# Patient Record
Sex: MALE | Race: WHITE | NOT HISPANIC OR LATINO | Employment: OTHER | URBAN - METROPOLITAN AREA
[De-identification: names, ages, dates, MRNs, and addresses within clinical notes are randomized per-mention and may not be internally consistent; named-entity substitution may affect disease eponyms.]

---

## 2022-12-20 ENCOUNTER — OFFICE VISIT (OUTPATIENT)
Dept: FAMILY MEDICINE CLINIC | Facility: CLINIC | Age: 52
End: 2022-12-20

## 2022-12-20 VITALS
WEIGHT: 187 LBS | TEMPERATURE: 97.8 F | DIASTOLIC BLOOD PRESSURE: 70 MMHG | SYSTOLIC BLOOD PRESSURE: 110 MMHG | BODY MASS INDEX: 26.77 KG/M2 | HEART RATE: 78 BPM | HEIGHT: 70 IN | OXYGEN SATURATION: 98 %

## 2022-12-20 DIAGNOSIS — R05.3 PERSISTENT COUGH: ICD-10-CM

## 2022-12-20 DIAGNOSIS — R06.02 SHORTNESS OF BREATH: ICD-10-CM

## 2022-12-20 RX ORDER — METHYLPREDNISOLONE 4 MG/1
TABLET ORAL
Qty: 21 EACH | Refills: 0 | Status: SHIPPED | OUTPATIENT
Start: 2022-12-20

## 2022-12-20 RX ORDER — LEVOCETIRIZINE DIHYDROCHLORIDE 5 MG/1
5 TABLET, FILM COATED ORAL EVERY EVENING
Qty: 90 TABLET | Refills: 1 | Status: SHIPPED | OUTPATIENT
Start: 2022-12-20

## 2022-12-20 RX ORDER — DOXYCYCLINE HYCLATE 100 MG/1
100 CAPSULE ORAL EVERY 12 HOURS SCHEDULED
Qty: 20 CAPSULE | Refills: 0 | Status: SHIPPED | OUTPATIENT
Start: 2022-12-20 | End: 2022-12-30

## 2022-12-20 NOTE — PROGRESS NOTES
Assessment/Plan:         Problem List Items Addressed This Visit        Other    Persistent cough    Relevant Medications    levocetirizine (XYZAL) 5 MG tablet    doxycycline hyclate (VIBRAMYCIN) 100 mg capsule    methylPREDNISolone 4 MG tablet therapy pack    Shortness of breath    Relevant Medications    levocetirizine (XYZAL) 5 MG tablet    doxycycline hyclate (VIBRAMYCIN) 100 mg capsule    methylPREDNISolone 4 MG tablet therapy pack         Subjective:      Patient ID: Charlott Opitz is a 47 y o  male  Tara Caldwell has been sick for about 11 days, he has been taking robutussin for 10 days  The following portions of the patient's history were reviewed and updated as appropriate:   Past Medical History:  He has a past medical history of Anxiety, Depression, HILLARY (obstructive sleep apnea), and Snoring  ,  _______________________________________________________________________  Medical Problems:  does not have any pertinent problems on file ,  _______________________________________________________________________  Past Surgical History:   has a past surgical history that includes Back surgery; Lumbar discectomy; Hand nerve repair (Left); pr esophagogastroduodenoscopy transoral diagnostic (N/A, 6/26/2017); and Colonoscopy  ,  _______________________________________________________________________  Family History:  family history includes ADD / ADHD in his mother; Bipolar disorder in his brother, mother, and son; Substance Abuse in his brother and mother ,  _______________________________________________________________________  Social History:   reports that he has quit smoking  His smokeless tobacco use includes chew  He reports current alcohol use of about 4 0 standard drinks per week  He reports that he does not use drugs  ,  _______________________________________________________________________  Allergies:  is allergic to diclofenac sodium     _______________________________________________________________________  Current Outpatient Medications   Medication Sig Dispense Refill   • doxycycline hyclate (VIBRAMYCIN) 100 mg capsule Take 1 capsule (100 mg total) by mouth every 12 (twelve) hours for 10 days 20 capsule 0   • levocetirizine (XYZAL) 5 MG tablet Take 1 tablet (5 mg total) by mouth every evening 90 tablet 1   • methylPREDNISolone 4 MG tablet therapy pack Use as directed on package 21 each 0   • levothyroxine 50 mcg tablet TAKE 1 TABLET BY MOUTH EVERY DAY 30 tablet 5   • montelukast (SINGULAIR) 10 mg tablet Take 1 tablet (10 mg total) by mouth daily at bedtime 30 tablet 2   • pantoprazole (PROTONIX) 40 mg tablet TAKE 1 TABLET BY MOUTH EVERY DAY 30 tablet 11     No current facility-administered medications for this visit      _______________________________________________________________________  Review of Systems   Constitutional: Positive for fatigue  Negative for chills and fever  HENT: Positive for congestion, rhinorrhea, sinus pressure, sinus pain and sore throat  Negative for ear pain  Eyes: Negative for pain and visual disturbance  Respiratory: Positive for cough, chest tightness, shortness of breath and wheezing  Cardiovascular: Negative for chest pain and palpitations  Gastrointestinal: Negative for abdominal pain, diarrhea, nausea and vomiting  Genitourinary: Negative for dysuria, frequency, hematuria and urgency  Musculoskeletal: Negative for arthralgias, back pain and myalgias  Skin: Negative for color change and rash  Neurological: Negative for dizziness, seizures, syncope and light-headedness  Psychiatric/Behavioral: Positive for sleep disturbance  All other systems reviewed and are negative  Objective:  Vitals:    12/20/22 1102   BP: 110/70   Pulse: 78   Temp: 97 8 °F (36 6 °C)   SpO2: 98%   Weight: 84 8 kg (187 lb)   Height: 5' 10" (1 778 m)     Body mass index is 26 83 kg/m²       Physical Exam  Vitals and nursing note reviewed  Constitutional:       General: He is not in acute distress  Appearance: Normal appearance  He is not ill-appearing  HENT:      Head: Normocephalic  Right Ear: Ear canal and external ear normal  There is no impacted cerumen  Tympanic membrane is erythematous and bulging  Left Ear: Ear canal and external ear normal  There is no impacted cerumen  Tympanic membrane is erythematous and bulging  Nose: Nose normal       Mouth/Throat:      Mouth: Mucous membranes are moist       Pharynx: Posterior oropharyngeal erythema present  Eyes:      General:         Right eye: No discharge  Left eye: No discharge  Conjunctiva/sclera: Conjunctivae normal    Cardiovascular:      Rate and Rhythm: Normal rate and regular rhythm  Pulses: Normal pulses  Heart sounds: Normal heart sounds  No murmur heard  Pulmonary:      Effort: Pulmonary effort is normal  No respiratory distress  Breath sounds: Wheezing present  Abdominal:      General: Abdomen is flat  Bowel sounds are normal    Musculoskeletal:         General: Normal range of motion  Cervical back: Normal range of motion  Right lower leg: No edema  Left lower leg: No edema  Skin:     General: Skin is warm and dry  Neurological:      Mental Status: He is alert and oriented to person, place, and time     Psychiatric:         Mood and Affect: Mood normal          Behavior: Behavior normal

## 2022-12-25 ENCOUNTER — TELEPHONE (OUTPATIENT)
Dept: OTHER | Facility: OTHER | Age: 52
End: 2022-12-25

## 2022-12-25 DIAGNOSIS — E03.9 ACQUIRED HYPOTHYROIDISM: ICD-10-CM

## 2022-12-25 DIAGNOSIS — R05.3 CHRONIC COUGH: ICD-10-CM

## 2022-12-25 RX ORDER — LEVOTHYROXINE SODIUM 0.05 MG/1
TABLET ORAL
Qty: 30 TABLET | Refills: 5 | Status: SHIPPED | OUTPATIENT
Start: 2022-12-25

## 2022-12-25 NOTE — TELEPHONE ENCOUNTER
Patient is calling regarding cancelling an appointment  Date/Time: Tues 12/27 @ 1000    Patient was rescheduled: YES [] NO [x]    Patient requesting call back to reschedule: YES [] NO [x]    Patient states the appointment is no longer needed, as he is feeling much better  Appointment was not cancelled out of Epic

## 2022-12-27 RX ORDER — MONTELUKAST SODIUM 10 MG/1
TABLET ORAL
Qty: 30 TABLET | Refills: 2 | Status: SHIPPED | OUTPATIENT
Start: 2022-12-27

## 2023-01-16 ENCOUNTER — TELEPHONE (OUTPATIENT)
Dept: FAMILY MEDICINE CLINIC | Facility: CLINIC | Age: 53
End: 2023-01-16

## 2023-01-16 NOTE — TELEPHONE ENCOUNTER
Patient left message stating he was seen a few weeks ago and after completing his medication he still is not feeling well  I left a messaged requesting a call back to schedule the patient an office visit

## 2023-01-18 ENCOUNTER — TELEPHONE (OUTPATIENT)
Dept: FAMILY MEDICINE CLINIC | Facility: CLINIC | Age: 53
End: 2023-01-18

## 2023-01-18 NOTE — TELEPHONE ENCOUNTER
Patient returned Marisa's call  I read him the message she stated to see if he can schedule an office visit and he declined  He said he was hoping to have this taken care of over the phone

## 2023-03-30 DIAGNOSIS — R05.3 CHRONIC COUGH: ICD-10-CM

## 2023-03-31 RX ORDER — MONTELUKAST SODIUM 10 MG/1
TABLET ORAL
Qty: 30 TABLET | Refills: 2 | Status: SHIPPED | OUTPATIENT
Start: 2023-03-31

## 2023-04-06 ENCOUNTER — TELEPHONE (OUTPATIENT)
Dept: PULMONOLOGY | Facility: CLINIC | Age: 53
End: 2023-04-06

## 2023-04-06 DIAGNOSIS — G47.33 OSA ON CPAP: Primary | ICD-10-CM

## 2023-04-06 DIAGNOSIS — Z99.89 OSA ON CPAP: Primary | ICD-10-CM

## 2023-04-06 NOTE — TELEPHONE ENCOUNTER
I ordered CPAP supplies for him - looks like he just received a new CPAP in September so I do not think he is looking for a script for the actual CPAP

## 2023-04-06 NOTE — TELEPHONE ENCOUNTER
Patient left  stating he needs a new script for his cpap machine to be sent to [facility]  He stated he is not sure how to go about getting this  Please advise

## 2023-06-27 DIAGNOSIS — R05.3 CHRONIC COUGH: ICD-10-CM

## 2023-06-27 DIAGNOSIS — E03.9 ACQUIRED HYPOTHYROIDISM: ICD-10-CM

## 2023-06-27 RX ORDER — LEVOTHYROXINE SODIUM 0.05 MG/1
TABLET ORAL
Qty: 30 TABLET | Refills: 5 | Status: SHIPPED | OUTPATIENT
Start: 2023-06-27

## 2023-06-28 RX ORDER — MONTELUKAST SODIUM 10 MG/1
TABLET ORAL
Qty: 30 TABLET | Refills: 2 | Status: SHIPPED | OUTPATIENT
Start: 2023-06-28

## 2023-10-08 DIAGNOSIS — R05.3 CHRONIC COUGH: ICD-10-CM

## 2023-10-09 RX ORDER — MONTELUKAST SODIUM 10 MG/1
TABLET ORAL
Qty: 30 TABLET | Refills: 2 | Status: SHIPPED | OUTPATIENT
Start: 2023-10-09 | End: 2023-10-15

## 2023-10-10 NOTE — TELEPHONE ENCOUNTER
Patient called stating that he has not been taking the Singulair medication and he doesn't know why the pharmacy called in for a refill. Patient asked to d/c the Singulair script and he does not wish to f/u with our office at this time. Please advise.

## 2023-10-15 ENCOUNTER — OFFICE VISIT (OUTPATIENT)
Age: 53
End: 2023-10-15
Payer: COMMERCIAL

## 2023-10-15 VITALS
RESPIRATION RATE: 18 BRPM | HEART RATE: 56 BPM | TEMPERATURE: 97.6 F | DIASTOLIC BLOOD PRESSURE: 68 MMHG | SYSTOLIC BLOOD PRESSURE: 108 MMHG | OXYGEN SATURATION: 97 % | HEIGHT: 70 IN | BODY MASS INDEX: 28.63 KG/M2 | WEIGHT: 200 LBS

## 2023-10-15 DIAGNOSIS — J01.90 ACUTE BACTERIAL SINUSITIS: Primary | ICD-10-CM

## 2023-10-15 DIAGNOSIS — B96.89 ACUTE BACTERIAL SINUSITIS: Primary | ICD-10-CM

## 2023-10-15 PROCEDURE — 99213 OFFICE O/P EST LOW 20 MIN: CPT

## 2023-10-15 RX ORDER — AMOXICILLIN AND CLAVULANATE POTASSIUM 875; 125 MG/1; MG/1
1 TABLET, FILM COATED ORAL EVERY 12 HOURS SCHEDULED
Qty: 14 TABLET | Refills: 0 | Status: SHIPPED | OUTPATIENT
Start: 2023-10-15 | End: 2023-10-22

## 2023-10-15 RX ORDER — PREDNISONE 20 MG/1
20 TABLET ORAL 2 TIMES DAILY
Qty: 10 TABLET | Refills: 0 | Status: SHIPPED | OUTPATIENT
Start: 2023-10-15 | End: 2023-10-20

## 2023-10-15 NOTE — PROGRESS NOTES
[facility]        NAME: Latrelle Gitelman is a 47 y.o. male  : 1970    MRN: [id number]  DATE: October 15, 2023  TIME: 2:04 PM    Assessment and Plan   Acute bacterial sinusitis [J01.90, B96.89]  1. Acute bacterial sinusitis  predniSONE 20 mg tablet    amoxicillin-clavulanate (AUGMENTIN) 875-125 mg per tablet            Patient Instructions   Take antibiotics as prescribed to help fight off the sinus infection. Steroids will help with the symptoms by opening up the sinuses and airway. Keep well hydrated    Follow up with PCP in 3-5 days if not improving. Proceed to ER if symptoms worsen. Chief Complaint     Chief Complaint   Patient presents with   • Nasal Congestion     Symptoms started about 9 days ago. C/o cough, chest congestion, fatigue and yellow phlegm. Otc taken. History of Present Illness       Cough and nasal congestion starting 9 days ago, was feeling better 3 days ago and then symptoms got worse. Has been taking Robitussin DM. 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 Review of Systems   Review of Systems   HENT:  Positive for postnasal drip, sinus pressure and sore throat. Respiratory:  Positive for cough. Negative for shortness of breath. Cardiovascular:  Negative for chest pain. Gastrointestinal:  Negative for diarrhea, nausea and vomiting. Neurological:  Negative for dizziness and light-headedness.          Current Medications       Current Outpatient Medications:   •  amoxicillin-clavulanate (AUGMENTIN) 875-125 mg per tablet, Take 1 tablet by mouth every 12 (twelve) hours for 7 days, Disp: 14 tablet, Rfl: 0  •  levothyroxine 50 mcg tablet, TAKE 1 TABLET BY MOUTH EVERY DAY, Disp: 30 tablet, Rfl: 5  •  pantoprazole (PROTONIX) 40 mg tablet, TAKE 1 TABLET BY MOUTH EVERY DAY, Disp: 30 tablet, Rfl: 0  •  predniSONE 20 mg tablet, Take 1 tablet (20 mg total) by mouth 2 (two) times a day for 5 days, Disp: 10 tablet, Rfl: 0    Current Allergies Allergies as of 10/15/2023 - Reviewed 10/15/2023   Allergen Reaction Noted   • Diclofenac sodium Rash 08/30/2022            The following portions of the patient's history were reviewed and updated as appropriate: allergies, current medications, past family history, past medical history, past social history, past surgical history and problem list.     Past Medical History:   Diagnosis Date   • Anxiety    • Depression    • HILLARY (obstructive sleep apnea)    • Snoring        Past Surgical History:   Procedure Laterality Date   • BACK SURGERY     • COLONOSCOPY     • HAND NERVE REPAIR Left    • LUMBAR DISCECTOMY     • [place] ESOPHAGOGASTRODUODENOSCOPY TRANSORAL DIAGNOSTIC N/A 6/26/2017    Procedure: EGD AND COLONOSCOPY;  Surgeon: Iliana Ramon MD;  Location: [facility]; Service: Gastroenterology       Family History   Problem Relation Age of Onset   • Bipolar disorder Mother    • ADD / ADHD Mother         ADHD   • Substance Abuse Mother    • Bipolar disorder Brother    • Substance Abuse Brother    • Bipolar disorder Son          Medications have been verified. Objective   /68   Pulse 56   Temp 97.6 °F (36.4 °C)   Resp 18   Ht 5' 10" (1.778 m)   Wt 90.7 kg (200 lb)   SpO2 97%   BMI 28.70 kg/m²   No LMP for male patient. Physical Exam     Physical Exam  Vitals and nursing note reviewed. Constitutional:       Appearance: Normal appearance. HENT:      Head: Normocephalic and atraumatic. Right Ear: A middle ear effusion is present. Left Ear: A middle ear effusion is present. Nose: Congestion present. Right Sinus: Frontal sinus tenderness present. No maxillary sinus tenderness. Left Sinus: Frontal sinus tenderness present. No maxillary sinus tenderness. Cardiovascular:      Pulses: Normal pulses. Pulmonary:      Effort: Pulmonary effort is normal.      Breath sounds: Normal breath sounds. Skin:     General: Skin is warm and dry.       Capillary Refill: Capillary refill takes less than 2 seconds. Neurological:      General: No focal deficit present. Mental Status: He is alert and oriented to person, place, and time. Mental status is at baseline. Sensory: No sensory deficit. Motor: No weakness. Psychiatric:         Mood and Affect: Mood normal.         Behavior: Behavior normal.         Thought Content:  Thought content normal.

## 2023-10-15 NOTE — PATIENT INSTRUCTIONS
Take antibiotics as prescribed to help fight off the sinus infection. Steroids will help with the symptoms by opening up the sinuses and airway. Keep well hydrated    Follow up with PCP in 3-5 days if not improving. Proceed to ER if symptoms worsen.

## 2023-11-10 DIAGNOSIS — K21.9 GASTROESOPHAGEAL REFLUX DISEASE: ICD-10-CM

## 2023-11-10 RX ORDER — PANTOPRAZOLE SODIUM 40 MG/1
TABLET, DELAYED RELEASE ORAL
Qty: 30 TABLET | Refills: 0 | Status: SHIPPED | OUTPATIENT
Start: 2023-11-10

## 2023-11-26 ENCOUNTER — OFFICE VISIT (OUTPATIENT)
Age: 53
End: 2023-11-26
Payer: COMMERCIAL

## 2023-11-26 VITALS
BODY MASS INDEX: 29.33 KG/M2 | TEMPERATURE: 97 F | DIASTOLIC BLOOD PRESSURE: 74 MMHG | WEIGHT: 204.4 LBS | HEART RATE: 72 BPM | SYSTOLIC BLOOD PRESSURE: 116 MMHG | RESPIRATION RATE: 18 BRPM | OXYGEN SATURATION: 98 %

## 2023-11-26 DIAGNOSIS — J01.00 ACUTE NON-RECURRENT MAXILLARY SINUSITIS: Primary | ICD-10-CM

## 2023-11-26 PROCEDURE — 99213 OFFICE O/P EST LOW 20 MIN: CPT

## 2023-11-26 RX ORDER — DOXYCYCLINE 100 MG/1
100 TABLET ORAL 2 TIMES DAILY
Qty: 14 TABLET | Refills: 0 | Status: SHIPPED | OUTPATIENT
Start: 2023-11-26 | End: 2023-12-03

## 2023-11-26 NOTE — PROGRESS NOTES
[facility]        NAME: Rylan Li is a 47 y.o. male  : 1970    MRN: [id number]  DATE: 2023  TIME: 1:07 PM    Assessment and Plan   Acute non-recurrent maxillary sinusitis [J01.00]  1. Acute non-recurrent maxillary sinusitis  doxycycline (ADOXA) 100 MG tablet            Patient Instructions   Start and complete course of antibiotics. Continue Sudafed for decongestion. Continue Vicks, keep well hydrated. Follow up with PCP in 3-5 days if not improving. Proceed to ER if symptoms worsen. Chief Complaint     Chief Complaint   Patient presents with   • Cough     Symptoms started about 7 days ago. C/o on congestion, fatigue and yellow/ light green  phlegm. Otc taken . History of Present Illness       Nasal congestion and runny nose starting about 7 days ago. Has been taking Sudafed and other OTC medications. Thought he was feeling better yesterday but woke up feeling worse again. Review of Systems   Review of Systems   Constitutional:  Positive for diaphoresis. Negative for chills and fever. HENT:  Positive for congestion. Negative for ear pain. Eyes:  Negative for pain and visual disturbance. Respiratory:  Negative for cough and shortness of breath. Cardiovascular:  Negative for chest pain and palpitations. Gastrointestinal:  Negative for abdominal pain and vomiting. Genitourinary:  Negative for dysuria and hematuria. Musculoskeletal:  Negative for arthralgias and back pain. Skin:  Negative for color change and rash. Neurological:  Negative for dizziness, seizures, syncope, weakness and light-headedness. All other systems reviewed and are negative.         Current Medications       Current Outpatient Medications:   •  doxycycline (ADOXA) 100 MG tablet, Take 1 tablet (100 mg total) by mouth 2 (two) times a day for 7 days, Disp: 14 tablet, Rfl: 0  •  levothyroxine 50 mcg tablet, TAKE 1 TABLET BY MOUTH EVERY DAY, Disp: 30 tablet, Rfl: 5  • pantoprazole (PROTONIX) 40 mg tablet, TAKE 1 TABLET BY MOUTH EVERY DAY, Disp: 30 tablet, Rfl: 0    Current Allergies     Allergies as of 11/26/2023 - Reviewed 11/26/2023   Allergen Reaction Noted   • Diclofenac sodium Rash 08/30/2022   • Diclofenac sodium Rash 11/26/2023            The following portions of the patient's history were reviewed and updated as appropriate: allergies, current medications, past family history, past medical history, past social history, past surgical history and problem list.     Past Medical History:   Diagnosis Date   • Anxiety    • Depression    • HILLARY (obstructive sleep apnea)    • Snoring        Past Surgical History:   Procedure Laterality Date   • BACK SURGERY     • COLONOSCOPY     • HAND NERVE REPAIR Left    • LUMBAR DISCECTOMY     • [place] ESOPHAGOGASTRODUODENOSCOPY TRANSORAL DIAGNOSTIC N/A 6/26/2017    Procedure: EGD AND COLONOSCOPY;  Surgeon: Sera Carballo MD;  Location: [facility]; Service: Gastroenterology       Family History   Problem Relation Age of Onset   • Bipolar disorder Mother    • ADD / ADHD Mother         ADHD   • Substance Abuse Mother    • Bipolar disorder Brother    • Substance Abuse Brother    • Bipolar disorder Son          Medications have been verified. Objective   /74   Pulse 72   Temp (!) 97 °F (36.1 °C)   Resp 18   Wt 92.7 kg (204 lb 6.4 oz)   SpO2 98%   BMI 29.33 kg/m²   No LMP for male patient. Physical Exam     Physical Exam  Vitals and nursing note reviewed. Constitutional:       Appearance: Normal appearance. HENT:      Head: Normocephalic and atraumatic. Nose: Congestion present. Right Sinus: Maxillary sinus tenderness present. No frontal sinus tenderness. Left Sinus: Maxillary sinus tenderness present. No frontal sinus tenderness. Pulmonary:      Effort: Pulmonary effort is normal.   Skin:     General: Skin is warm and dry. Capillary Refill: Capillary refill takes less than 2 seconds. Neurological:      General: No focal deficit present. Mental Status: He is alert and oriented to person, place, and time. Mental status is at baseline. Sensory: No sensory deficit. Motor: No weakness. Psychiatric:         Mood and Affect: Mood normal.         Behavior: Behavior normal.         Thought Content:  Thought content normal.

## 2023-11-26 NOTE — PATIENT INSTRUCTIONS
Start and complete course of antibiotics. Continue Sudafed for decongestion. Continue Vicks, keep well hydrated. Follow up with PCP in 3-5 days if not improving. Proceed to ER if symptoms worsen.

## 2023-12-05 ENCOUNTER — OFFICE VISIT (OUTPATIENT)
Dept: FAMILY MEDICINE CLINIC | Facility: CLINIC | Age: 53
End: 2023-12-05
Payer: COMMERCIAL

## 2023-12-05 VITALS
OXYGEN SATURATION: 98 % | HEART RATE: 77 BPM | HEIGHT: 70 IN | SYSTOLIC BLOOD PRESSURE: 122 MMHG | WEIGHT: 202 LBS | TEMPERATURE: 98 F | BODY MASS INDEX: 28.92 KG/M2 | DIASTOLIC BLOOD PRESSURE: 78 MMHG

## 2023-12-05 DIAGNOSIS — R09.82 POST-NASAL DRAINAGE: Primary | ICD-10-CM

## 2023-12-05 DIAGNOSIS — R05.3 CHRONIC COUGH: ICD-10-CM

## 2023-12-05 DIAGNOSIS — E03.9 ACQUIRED HYPOTHYROIDISM: ICD-10-CM

## 2023-12-05 PROCEDURE — 99214 OFFICE O/P EST MOD 30 MIN: CPT | Performed by: FAMILY MEDICINE

## 2023-12-05 RX ORDER — GABAPENTIN 300 MG/1
CAPSULE ORAL
COMMUNITY

## 2023-12-05 RX ORDER — ALBUTEROL SULFATE 90 UG/1
AEROSOL, METERED RESPIRATORY (INHALATION)
COMMUNITY

## 2023-12-05 RX ORDER — FLUTICASONE PROPIONATE 50 MCG
SPRAY, SUSPENSION (ML) NASAL
COMMUNITY

## 2023-12-05 RX ORDER — TRAMADOL HYDROCHLORIDE 50 MG/1
TABLET ORAL
COMMUNITY

## 2023-12-05 RX ORDER — BENZONATATE 200 MG/1
200 CAPSULE ORAL 3 TIMES DAILY PRN
Qty: 20 CAPSULE | Refills: 0 | Status: SHIPPED | OUTPATIENT
Start: 2023-12-05

## 2023-12-05 RX ORDER — OXYCODONE HYDROCHLORIDE 5 MG/1
TABLET ORAL
COMMUNITY

## 2023-12-05 RX ORDER — AZELASTINE 1 MG/ML
1 SPRAY, METERED NASAL 2 TIMES DAILY
Qty: 30 ML | Refills: 1 | Status: SHIPPED | OUTPATIENT
Start: 2023-12-05

## 2023-12-05 RX ORDER — DIAZEPAM 5 MG/1
TABLET ORAL
COMMUNITY

## 2023-12-05 NOTE — PROGRESS NOTES
Assessment/Plan:     Chronic Problems:  No problem-specific Assessment & Plan notes found for this encounter. Visit Diagnosis:  Diagnoses and all orders for this visit:    Post-nasal drainage  -     azelastine (ASTELIN) 0.1 % nasal spray; 1 spray into each nostril 2 (two) times a day Use in each nostril as directed  -     benzonatate (TESSALON) 200 MG capsule; Take 1 capsule (200 mg total) by mouth 3 (three) times a day as needed for cough    Acquired hypothyroidism    Chronic cough    Other orders  -     albuterol (PROVENTIL HFA,VENTOLIN HFA) 90 mcg/act inhaler; INHALE 2 PUFFS EVERY 6 HOURS AS NEEDED FOR WHEEZING  -     diazepam (VALIUM) 5 mg tablet  -     fluticasone (FLONASE) 50 mcg/act nasal spray; SPRAY 1 SPRAY INTO EACH NOSTRIL EVERY DAY  -     gabapentin (NEURONTIN) 300 mg capsule  -     mupirocin (BACTROBAN) 2 % ointment  -     oxyCODONE (ROXICODONE) 5 immediate release tablet  -     sertraline (ZOLOFT) 50 mg tablet; Take 1 tablet by mouth daily  -     traMADol (ULTRAM) 50 mg tablet    Discussed and reviewed previous care received by patient over the past weeks to include 2 courses of antibiotics 1 course of steroids, no systemic signs / symptoms admitted to nor present currently, discussed potential environmental factors, will treat symptomatically        Subjective:    Patient ID: Jai Danielle is a 47 y.o. male.     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 There is no history of environmental allergies. The following portions of the patient's history were reviewed and updated as appropriate: allergies, current medications, past family history, past medical history, past social history, past surgical history and problem list.    Review of Systems   Constitutional:  Negative for appetite change, chills, fever and unexpected weight change. HENT:  Negative for congestion, dental problem, ear pain, hearing loss, postnasal drip, rhinorrhea, sinus pressure, sinus pain, sneezing, sore throat, tinnitus and voice change. Eyes:  Negative for visual disturbance. Respiratory:  Positive for cough. Negative for apnea, chest tightness and shortness of breath. Cardiovascular:  Negative for chest pain, palpitations and leg swelling. Gastrointestinal:  Negative for abdominal pain, blood in stool, constipation, diarrhea, nausea and vomiting. Endocrine: Negative for cold intolerance, heat intolerance, polydipsia, polyphagia and polyuria. Genitourinary:  Negative for decreased urine volume, difficulty urinating, dysuria, frequency and hematuria. Musculoskeletal:  Negative for arthralgias, back pain, gait problem, joint swelling and myalgias. Skin:  Negative for color change, rash and wound. Allergic/Immunologic: Negative for environmental allergies and food allergies. Neurological:  Negative for dizziness, syncope, weakness, light-headedness, numbness and headaches. Hematological:  Negative for adenopathy. Does not bruise/bleed easily. Psychiatric/Behavioral:  Negative for sleep disturbance and suicidal ideas. The patient is not nervous/anxious.           /78 (BP Location: Left arm, Patient Position: Sitting)   Pulse 77   Temp 98 °F (36.7 °C)   Ht 5' 10" (1.778 m)   Wt 91.6 kg (202 lb)   SpO2 98%   BMI 28.98 kg/m²   Social History     Socioeconomic History    Marital status: /Civil Union     Spouse name: Not on file    Number of children: 2    Years of education: full-time student    Highest education level: Not on file   Occupational History    Occupation: employed   Tobacco Use    Smoking status: Former    Smokeless tobacco: Current     Types: Chew    Tobacco comments:     social smoker   Vaping Use    Vaping Use: Never used   Substance and Sexual Activity    Alcohol use: Yes     Alcohol/week: 4.0 standard drinks of alcohol     Types: 2 Cans of beer, 2 Shots of liquor per week     Comment: weekly    Drug use: No    Sexual activity: Not on file   Other Topics Concern    Not on file   Social History Narrative    Daily caffeinated coffee consumption    Lives with spouse     Social Determinants of Health     Financial Resource Strain: Not on file   Food Insecurity: Not on file   Transportation Needs: Not on file   Physical Activity: Not on file   Stress: Not on file   Social Connections: Not on file   Intimate Partner Violence: Not on file   Housing Stability: Not on file     Past Medical History:   Diagnosis Date    Anxiety     Depression     HILLARY (obstructive sleep apnea)     Snoring      Family History   Problem Relation Age of Onset    Bipolar disorder Mother     ADD / ADHD Mother         ADHD    Substance Abuse Mother     Bipolar disorder Brother     Substance Abuse Brother     Bipolar disorder Son      Past Surgical History:   Procedure Laterality Date    BACK SURGERY      COLONOSCOPY      HAND NERVE REPAIR Left     LUMBAR DISCECTOMY      ME ESOPHAGOGASTRODUODENOSCOPY TRANSORAL DIAGNOSTIC N/A 6/26/2017    Procedure: EGD AND COLONOSCOPY;  Surgeon: Zhou Zhang MD;  Location: [facility];   Service: Gastroenterology       Current Outpatient Medications:     albuterol (PROVENTIL HFA,VENTOLIN HFA) 90 mcg/act inhaler, INHALE 2 PUFFS EVERY 6 HOURS AS NEEDED FOR WHEEZING, Disp: , Rfl:     azelastine (ASTELIN) 0.1 % nasal spray, 1 spray into each nostril 2 (two) times a day Use in each nostril as directed, Disp: 30 mL, Rfl: 1    benzonatate (TESSALON) 200 MG capsule, Take 1 capsule (200 mg total) by mouth 3 (three) times a day as needed for cough, Disp: 20 capsule, Rfl: 0    diazepam (VALIUM) 5 mg tablet, , Disp: , Rfl:     fluticasone (FLONASE) 50 mcg/act nasal spray, SPRAY 1 SPRAY INTO EACH NOSTRIL EVERY DAY, Disp: , Rfl:     gabapentin (NEURONTIN) 300 mg capsule, , Disp: , Rfl:     levothyroxine 50 mcg tablet, TAKE 1 TABLET BY MOUTH EVERY DAY, Disp: 30 tablet, Rfl: 5    mupirocin (BACTROBAN) 2 % ointment, , Disp: , Rfl:     oxyCODONE (ROXICODONE) 5 immediate release tablet, , Disp: , Rfl:     pantoprazole (PROTONIX) 40 mg tablet, TAKE 1 TABLET BY MOUTH EVERY DAY, Disp: 30 tablet, Rfl: 0    sertraline (ZOLOFT) 50 mg tablet, Take 1 tablet by mouth daily, Disp: , Rfl:     traMADol (ULTRAM) 50 mg tablet, , Disp: , Rfl:     Allergies   Allergen Reactions    Diclofenac Sodium Rash    Diclofenac Sodium Rash          Lab Review   No visits with results within 6 Month(s) from this visit.    Latest known visit with results is:   Telephone on 04/06/2023   Component Date Value    Supplier Name 04/10/2023 [facility] - [address]     Supplier Phone Number 04/10/2023 [phone]     Order Status 04/10/2023 Delivery Successful     Delivery Request Date 04/10/2023 04/10/2023     Date Delivered  04/10/2023 04/10/2023     Item Description 04/10/2023 PAP Accessory     Item Description 04/10/2023 PAP Mask, Nasal, Fit Upon Setup, N/A, 1 per 3 months     Item Description 04/10/2023 Humidifier Water Chamber, 1 per 6 months     Item Description 04/10/2023 PAP Headgear, 1 per 6 months     Item Description 04/10/2023 PAP Chinstrap, 1 per 6 months     Item Description 04/10/2023 PAP Humidifier, Heated     Item Description 04/10/2023 Standard PAP Tubing, Non-Heated, 1 per 3 months     Item Description 04/10/2023 Heated PAP Tubing, 1 per 3 months     Item Description 04/10/2023 Disposable PAP Filter, 2 per 1 month     Item Description 04/10/2023 Non-Disposable PAP Filter, 1 per 6 months         Imaging: No results found. Objective:     Physical Exam  Constitutional:       General: He is not in acute distress. Appearance: He is well-developed. He is not ill-appearing or toxic-appearing. HENT:      Head: Normocephalic and atraumatic. Right Ear: Tympanic membrane normal.      Left Ear: Tympanic membrane normal.      Nose: Nose normal.   Eyes:      Conjunctiva/sclera: Conjunctivae normal.   Neck:      Vascular: No carotid bruit. Cardiovascular:      Rate and Rhythm: Normal rate and regular rhythm. Heart sounds: Normal heart sounds. Pulmonary:      Effort: Pulmonary effort is normal.      Breath sounds: Normal breath sounds. Musculoskeletal:         General: Normal range of motion. Cervical back: Normal range of motion and neck supple. Right lower leg: No edema. Left lower leg: No edema. Lymphadenopathy:      Cervical: No cervical adenopathy. Skin:     General: Skin is warm and dry. Neurological:      Mental Status: He is alert and oriented to person, place, and time. Deep Tendon Reflexes: Reflexes are normal and symmetric. Psychiatric:         Behavior: Behavior normal.         Thought Content: Thought content normal.         Judgment: Judgment normal.           There are no Patient Instructions on file for this visit. GERMÁN Medina    Portions of the record may have been created with voice recognition software. Occasional wrong word or "sound a like" substitutions may have occurred due to the inherent limitations of voice recognition software. Read the chart carefully and recognize, using context, where substitutions have occurred.

## 2023-12-09 DIAGNOSIS — K21.9 GASTROESOPHAGEAL REFLUX DISEASE: ICD-10-CM

## 2023-12-11 RX ORDER — PANTOPRAZOLE SODIUM 40 MG/1
TABLET, DELAYED RELEASE ORAL
Qty: 30 TABLET | Refills: 0 | Status: SHIPPED | OUTPATIENT
Start: 2023-12-11 | End: 2023-12-20 | Stop reason: SDUPTHER

## 2023-12-20 ENCOUNTER — OFFICE VISIT (OUTPATIENT)
Dept: GASTROENTEROLOGY | Facility: CLINIC | Age: 53
End: 2023-12-20
Payer: COMMERCIAL

## 2023-12-20 VITALS
OXYGEN SATURATION: 97 % | BODY MASS INDEX: 28.63 KG/M2 | HEART RATE: 66 BPM | DIASTOLIC BLOOD PRESSURE: 66 MMHG | SYSTOLIC BLOOD PRESSURE: 110 MMHG | HEIGHT: 70 IN | WEIGHT: 200 LBS | RESPIRATION RATE: 16 BRPM

## 2023-12-20 DIAGNOSIS — R05.3 CHRONIC COUGH: ICD-10-CM

## 2023-12-20 DIAGNOSIS — K21.00 GASTROESOPHAGEAL REFLUX DISEASE WITH ESOPHAGITIS WITHOUT HEMORRHAGE: Primary | ICD-10-CM

## 2023-12-20 PROCEDURE — 99213 OFFICE O/P EST LOW 20 MIN: CPT | Performed by: PHYSICIAN ASSISTANT

## 2023-12-20 RX ORDER — PANTOPRAZOLE SODIUM 40 MG/1
40 TABLET, DELAYED RELEASE ORAL DAILY
Qty: 30 TABLET | Refills: 11 | Status: SHIPPED | OUTPATIENT
Start: 2024-01-01

## 2023-12-20 NOTE — PROGRESS NOTES
[facility] - Outpatient Follow-up Note  Shailesh Butts 53 y.o. male MRN: [id number]  Encounter: [id number]          ASSESSMENT AND PLAN:      1. Chronic cough  - He reports a chronic cough that hasn't changed with PPI use and seems unrelated to reflux symptoms; he has known seasonal allergies and post nasal drip  - Discussed seeing ENT for further evaluation and management of his known allergic rhinitis to see if this treats his cough      2. Gastroesophageal reflux disease with esophagitis without hemorrhage  - He has a history of GERD with LA grade A reflux esophagitis when he had his EGD in 2017  - Good control of symptoms of pantoprazole 40 mg daily  - Discussed risks of long term PPI use  - He would like to continue this for now; discussed in the future considering trying to stop use and using pepcid for a few weeks initially to help ease him off of the PPI    ______________________________________________________________________    SUBJECTIVE: Shailesh is a 53-year-old male presenting for routine follow-up and refill of his pantoprazole.  He has been on this since 2017 when he had an endoscopy done which showed LA grade a reflux esophagitis no was having reflux symptoms.  He reports that this does control his reflux symptoms and if he misses it for couple days he definitely will get recurrent reflux.  He denies any dysphagia.  He is wondering if it is okay to take this long-term or not.  He has also had several colonoscopies with our group and is not having any bowel movement issues.  He also reports that he had a chronic cough that is been worse since September and he thinks he was sick initially with some kind of respiratory illness or flu but he continues to have a cough.  He reports he will have on and off runny nose and occasionally he will bring up clear liquid with his cough but otherwise will be dry.  He is not having any shortness of breath.  He had a chest x-ray last August  "which was done because of a chronic cough and this was normal.  He saw urgent care twice and also saw his family doctor because of the cough and he reports that he was trialed on multiple tablets including Tessalon Perles and then a nasal spray without any improvement in the cough.  He is never seen an ENT doctor for allergies but does know he has seasonal allergies.      REVIEW OF SYSTEMS IS OTHERWISE NEGATIVE.      Historical Information   Past Medical History:   Diagnosis Date    Anxiety     Depression     HILLARY (obstructive sleep apnea)     Snoring      Past Surgical History:   Procedure Laterality Date    BACK SURGERY      COLONOSCOPY      HAND NERVE REPAIR Left     LUMBAR DISCECTOMY      [place] ESOPHAGOGASTRODUODENOSCOPY TRANSORAL DIAGNOSTIC N/A 6/26/2017    Procedure: EGD AND COLONOSCOPY;  Surgeon: Meir Sims III, MD;  Location: [facility];  Service: Gastroenterology     Social History   Social History     Substance and Sexual Activity   Alcohol Use Yes    Alcohol/week: 4.0 standard drinks of alcohol    Types: 2 Cans of beer, 2 Shots of liquor per week    Comment: weekly     Social History     Substance and Sexual Activity   Drug Use No     Social History     Tobacco Use   Smoking Status Former   Smokeless Tobacco Current    Types: Chew   Tobacco Comments    social smoker     Family History   Problem Relation Age of Onset    Bipolar disorder Mother     ADD / ADHD Mother         ADHD    Substance Abuse Mother     Bipolar disorder Brother     Substance Abuse Brother     Bipolar disorder Son        Meds/Allergies       Current Outpatient Medications:     azelastine (ASTELIN) 0.1 % nasal spray    levothyroxine 50 mcg tablet    [START ON 1/1/2024] pantoprazole (PROTONIX) 40 mg tablet    Allergies   Allergen Reactions    Diclofenac Sodium Rash    Diclofenac Sodium Rash           Objective     Blood pressure 110/66, pulse 66, resp. rate 16, height 5' 10\" (1.778 m), weight 90.7 kg (200 lb), SpO2 97%. Body mass index " is 28.7 kg/m².      PHYSICAL EXAM:      General Appearance:   Alert, cooperative, no distress   HEENT:   Normocephalic, atraumatic, anicteric.     Neck:  Supple, symmetrical, trachea midline   Lungs:   Clear to auscultation bilaterally; no rales, rhonchi or wheezing; respirations unlabored    Heart::   Regular rate and rhythm; no murmur, rub, or gallop.   Abdomen:   Soft, non-tender, non-distended; normal bowel sounds; no masses, no organomegaly    Genitalia:   Deferred    Rectal:   Deferred    Extremities:  No cyanosis, clubbing or edema    Pulses:  2+ and symmetric    Skin:  No jaundice, rashes, or lesions    Lymph nodes:  No palpable cervical lymphadenopathy        Lab Results:   No visits with results within 1 Day(s) from this visit.   Latest known visit with results is:   Telephone on 04/06/2023   Component Date Value    Supplier Name 04/10/2023 [facility] [facility] [place]     Supplier Phone Number 04/10/2023 [phone]     Order Status 04/10/2023 Delivery Successful     Delivery Request Date 04/10/2023 04/10/2023     Date Delivered  04/10/2023 04/10/2023     Item Description 04/10/2023 PAP Accessory     Item Description 04/10/2023 PAP Mask, Nasal, Fit Upon Setup, N/A, 1 per 3 months     Item Description 04/10/2023 Humidifier Water Chamber, 1 per 6 months     Item Description 04/10/2023 PAP Headgear, 1 per 6 months     Item Description 04/10/2023 PAP Chinstrap, 1 per 6 months     Item Description 04/10/2023 PAP Humidifier, Heated     Item Description 04/10/2023 Standard PAP Tubing, Non-Heated, 1 per 3 months     Item Description 04/10/2023 Heated PAP Tubing, 1 per 3 months     Item Description 04/10/2023 Disposable PAP Filter, 2 per 1 month     Item Description 04/10/2023 Non-Disposable PAP Filter, 1 per 6 months          Radiology Results:   No results found.

## 2024-01-07 DIAGNOSIS — E03.9 ACQUIRED HYPOTHYROIDISM: ICD-10-CM

## 2024-01-08 RX ORDER — LEVOTHYROXINE SODIUM 0.05 MG/1
TABLET ORAL
Qty: 30 TABLET | Refills: 5 | Status: SHIPPED | OUTPATIENT
Start: 2024-01-08

## 2024-01-23 ENCOUNTER — TELEPHONE (OUTPATIENT)
Dept: FAMILY MEDICINE CLINIC | Facility: CLINIC | Age: 54
End: 2024-01-23

## 2024-01-23 DIAGNOSIS — R09.82 POST-NASAL DRAINAGE: Primary | ICD-10-CM

## 2024-01-23 RX ORDER — FLUTICASONE PROPIONATE 50 MCG
1 SPRAY, SUSPENSION (ML) NASAL DAILY
Qty: 16 G | Refills: 1 | Status: SHIPPED | OUTPATIENT
Start: 2024-01-23 | End: 2024-01-31 | Stop reason: SDUPTHER

## 2024-01-23 NOTE — TELEPHONE ENCOUNTER
Pt called and left message on Clinical line. Stated that he saw Bill in December for a chronic cough that he's had for a few months. He was prescribed a azelastine nasal spray but it didn't seem to work. Has tried other sprays. The only one that seems to have worked for him was what his wife, Danielle, uses. The fluticasone propionate nasal spray USP 50 mcg. He said it worked but is running out of it and the cough has come back and would like to see if this is something that could prescribed to him.

## 2024-01-31 DIAGNOSIS — R09.82 POST-NASAL DRAINAGE: ICD-10-CM

## 2024-01-31 RX ORDER — AZELASTINE HYDROCHLORIDE 137 UG/1
SPRAY, METERED NASAL
Qty: 30 ML | Refills: 1 | Status: SHIPPED | OUTPATIENT
Start: 2024-01-31

## 2024-01-31 RX ORDER — FLUTICASONE PROPIONATE 50 MCG
1 SPRAY, SUSPENSION (ML) NASAL DAILY
Qty: 16 G | Refills: 1 | Status: SHIPPED | OUTPATIENT
Start: 2024-01-31

## 2024-01-31 NOTE — TELEPHONE ENCOUNTER
Pt called and left message on Medline. Stated that he is using the Fluticasone nasal spray, is almost out and wants a refill on that, but today there was a refill put in for the azelastine nasal spray which he didn't want refilled. Are we able to resend the fluticasone instead and cancel the azelastine refill that was sent to the pharmacy today?

## 2024-03-30 DIAGNOSIS — R09.82 POST-NASAL DRAINAGE: ICD-10-CM

## 2024-04-01 RX ORDER — AZELASTINE HYDROCHLORIDE 137 UG/1
SPRAY, METERED NASAL
Qty: 30 ML | Refills: 1 | Status: SHIPPED | OUTPATIENT
Start: 2024-04-01

## 2024-04-20 DIAGNOSIS — R09.82 POST-NASAL DRAINAGE: ICD-10-CM

## 2024-04-22 RX ORDER — FLUTICASONE PROPIONATE 50 MCG
SPRAY, SUSPENSION (ML) NASAL
Qty: 16 ML | Refills: 1 | Status: SHIPPED | OUTPATIENT
Start: 2024-04-22

## 2024-05-14 ENCOUNTER — TELEPHONE (OUTPATIENT)
Dept: BEHAVIORAL/MENTAL HEALTH CLINIC | Facility: CLINIC | Age: 54
End: 2024-05-14

## 2024-05-15 NOTE — TELEPHONE ENCOUNTER
Shailesh Butts called the office and left a voicemail and  requested a call back to discuss scheduling..    They can be reached at P# [phone].       Thank you.

## 2024-05-22 ENCOUNTER — SOCIAL WORK (OUTPATIENT)
Dept: BEHAVIORAL/MENTAL HEALTH CLINIC | Facility: CLINIC | Age: 54
End: 2024-05-22
Payer: COMMERCIAL

## 2024-05-22 DIAGNOSIS — F41.8 DEPRESSION WITH ANXIETY: Primary | ICD-10-CM

## 2024-05-22 PROCEDURE — 90837 PSYTX W PT 60 MINUTES: CPT

## 2024-05-22 NOTE — BH TREATMENT PLAN
"Outpatient Behavioral Health Psychotherapy Treatment Plan    Shailesh Butts  1970     Date of Initial Psychotherapy Assessment: 5/22/2024   Date of Current Treatment Plan: 05/22/24  Treatment Plan Target Date: TBD  Treatment Plan Expiration Date: 11/20/2024    Diagnosis:   1. Depression with anxiety            Area(s) of Need: Increased frustration, depressed mood.    Long Term Goal 1 (in the client's own words): Less frustrated and angry.    Stage of Change: Contemplation    Target Date for completion: TBD     Anticipated therapeutic modalities: DBT, CBT, MI, mindfulness     People identified to complete this goal: Shailesh and therapist       Objective 1: (identify the means of measuring success in meeting the objective): Shailesh will learn and utilize 3 DBT and/or grounding techniques over the next 6 months when feeling frustrated or anxious.      Objective 2: (identify the means of measuring success in meeting the objective): Shailesh will set boundaries with family members and communicate needs with spouse.   Objective 3: Shailesh will learn use of stop/thought technique when frustrated.       I am currently under the care of a [facility] psychiatric provider: yes    My [facility] psychiatric provider is: n/a    I am currently taking psychiatric medications:  no behavioral health med prescribed at this time    I feel that I will be ready for discharge from mental health care when I reach the following (measurable goal/objective): \"I will wake up in the morning looking forward to the day.\"    For children and adults who have a legal guardian:   Has there been any change to custody orders and/or guardianship status? NA. If yes, attach updated documentation.    I have created my Crisis Plan and have been offered a copy of this plan    Behavioral Health Treatment Plan [facility]: Diagnosis and Treatment Plan explained to Shailesh Butts acknowledges an understanding of their diagnosis. Shailesh Benjamín agrees to " this treatment plan.    I have been offered a copy of this Treatment Plan. yes
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
## 2024-05-22 NOTE — BH CRISIS PLAN
Client Name: Shailesh Butts       Client YOB: 1970    [facility] Safety Plan      Creation Date: 5/22/24    Created By: Daniela Smith       Step 1: Warning Signs:   Warning Signs   more irritable   ruminate on thoughts (overthinking)   feel unappreciated            Step 2: Internal Coping Strategies:   Internal Coping Strategies   play golf            Step 3: People and social settings that provide distraction:   Name Contact Information   Donald/ brother Number in phone            Step 4: People whom I can ask for help during a crisis:      Name Contact Information    Danielle/ spouse number in phone      Step 5: Professionals or agencies I can contact during a crisis:      Clinican/Agency Name Phone Emergency Contact    Daniela Smith/ [facility] therapist - [phone], [email or web address]       Local Emergency Department Emergency Department Phone Emergency Department Address    [facility] [phone] [facility] [address]        Crisis Phone Numbers:   Suicide Prevention Lifeline: Call or Text  730 Crisis Text Line: Text HOME to 430-460   Please note: Some [facility] do not have a separate number for Child/Adolescent specific crisis. If your county is not listed under Child/Adolescent, please call the adult number for your county      Adult Crisis Numbers: Child/Adolescent Crisis Numbers   [facility]: [phone] [facility]: [phone]   [facility]: [phone] [facility]: [phone]   [facility]: [phone] [place]: [phone]   [facility]: [phone] [place]: [phone]   [place]: [phone]   [facility]: [phone]   [facility]: [phone]   [facility]: [phone] (daytime) [phone] (after hours, weekends, holidays)      Step 6: Making the environment safer (plan for lethal means safety):   Patient did not identify any lethal methods: Yes     Optional:  "What is most important to me and worth living for?   \"The love and respect  and appreciation of my family.\"     Mary Safety Plan. Carolyne Davison and Florencio Castillo. Used with permission of the authors.           "

## 2024-06-05 ENCOUNTER — SOCIAL WORK (OUTPATIENT)
Dept: BEHAVIORAL/MENTAL HEALTH CLINIC | Facility: CLINIC | Age: 54
End: 2024-06-05
Payer: COMMERCIAL

## 2024-06-05 DIAGNOSIS — F41.8 DEPRESSION WITH ANXIETY: Primary | ICD-10-CM

## 2024-06-05 PROCEDURE — 90837 PSYTX W PT 60 MINUTES: CPT

## 2024-06-05 NOTE — PSYCH
"Behavioral Health Psychotherapy Progress Note    Psychotherapy Provided: Individual Psychotherapy     1. Depression with anxiety            Goals addressed in session: Goal 1     DATA: Shailesh presents today casually dressed and in pleasant mood. We processed his week and he was able to share his situation more with therapist. He feels invalidated and unappreciated by family for his role in taking care of the family and his work. Stress in marital relationship surrounds extreme differences in parenting their son over the years. Son in now 23 years old and lives with them and he and spouse still differ in how this should be approached. Discussed with him that he seems to desire validation, and affirmation, He agreed. Suggested that he and spouse consider taking an inventory and increase their self-awareness about one another. Also offered couples sessions if needed.  For now, Shailesh is focusing on controlling his emotional regulation surrounding these issues.    During this session, this clinician used the following therapeutic modalities: Client-centered Therapy, Cognitive Behavioral Therapy, Dialectical Behavior Therapy, and Motivational Interviewing    Substance Abuse was not addressed during this session. If the client is diagnosed with a co-occurring substance use disorder, please indicate any changes in the frequency or amount of use: n/a. Stage of change for addressing substance use diagnoses: No substance use/Not applicable    ASSESSMENT:  Shailesh Butts presents with a Euthymic/ normal mood.     his affect is Normal range and intensity, which is congruent, with his mood and the content of the session. The client has made progress on their goals.     Shailesh Butts presents with a none risk of suicide, none risk of self-harm, and none risk of harm to others.    For any risk assessment that surpasses a \"low\" rating, a safety plan must be developed.    A safety plan was indicated: no  If yes, describe in detail " reviewed previous and no changes    PLAN: Between sessions, Shailesh Butts will do a self-awareness inventory with wife/ several free ones on internet suggested for him, and then they can open up conversation. At the next session, the therapist will use Client-centered Therapy, Cognitive Behavioral Therapy, Dialectical Behavior Therapy, Mindfulness-based Strategies, and Motivational Interviewing to address anger/depression.    Behavioral Health Treatment Plan and Discharge Planning: Shailesh Butts is aware of and agrees to continue to work on their treatment plan. They have identified and are working toward their discharge goals. yes    Visit start and stop times:    06/05/24  Start Time: 1220  Stop Time: 1320  Total Visit Time: 60 minutes

## 2024-06-19 ENCOUNTER — SOCIAL WORK (OUTPATIENT)
Dept: BEHAVIORAL/MENTAL HEALTH CLINIC | Facility: CLINIC | Age: 54
End: 2024-06-19
Payer: COMMERCIAL

## 2024-06-19 DIAGNOSIS — F41.8 DEPRESSION WITH ANXIETY: Primary | ICD-10-CM

## 2024-06-19 PROCEDURE — 90837 PSYTX W PT 60 MINUTES: CPT

## 2024-06-19 NOTE — PSYCH
Behavioral Health Psychotherapy Progress Note    Psychotherapy Provided: Individual Psychotherapy     1. Depression with anxiety            Goals addressed in session: Goal 1 and Goal 2     DATA: Shailesh presents today casually dressed and in pleasant mood. He is very motivated for therapy and did complete assignment last week to talk with spouse and complete love languages quiz with her. Both came out with words of affirmation as their main one. They also discussed issues with their adult son, but could not reach an action plan. We spent time processing this and his past week. Processed some of his upbringing, very poor growing up and he remembers going to school in 8th grade with his shoe ducktaped together. He says this is his motivation to always be financially stable. He worries about son not being able to support himself and feels deep regret regarding much of this situation.  He would like to ask his wife to come in for next session, therapist is agreeable to this family session as well.  Psychoeducation provided about family negative interaction cycles and challenged Shailesh to try to change his reaction to at least one thing with his son this week.  During this session, this clinician used the following therapeutic modalities: Client-centered Therapy, Cognitive Behavioral Therapy, and Motivational Interviewing    Substance Abuse was not addressed during this session. If the client is diagnosed with a co-occurring substance use disorder, please indicate any changes in the frequency or amount of use: n/a. Stage of change for addressing substance use diagnoses: No substance use/Not applicable    ASSESSMENT:  Shailesh Butts presents with a Euthymic/ normal mood.     his affect is Normal range and intensity, which is congruent, with his mood and the content of the session. The client has made progress on their goals.     Shailesh Butts presents with a none risk of suicide, none risk of self-harm, and none risk of  "harm to others.    For any risk assessment that surpasses a \"low\" rating, a safety plan must be developed.    A safety plan was indicated: no  If yes, describe in detail n/a    PLAN: Between sessions, Shailesh Butts will work on changing reactions as discussed. At the next session, the therapist will use Client-centered Therapy, Cognitive Behavioral Therapy, and Motivational Interviewing to address session with spouse, anxiety/depressive symptoms/ situational stressors.    Behavioral Health Treatment Plan and Discharge Planning: Shailesh Butts is aware of and agrees to continue to work on their treatment plan. They have identified and are working toward their discharge goals. yes    Visit start and stop times:    06/19/24  Start Time: 1435  Stop Time: 1530  Total Visit Time: 55 minutes  "

## 2024-06-23 DIAGNOSIS — E03.9 ACQUIRED HYPOTHYROIDISM: ICD-10-CM

## 2024-06-23 DIAGNOSIS — R09.82 POST-NASAL DRAINAGE: ICD-10-CM

## 2024-06-23 RX ORDER — LEVOTHYROXINE SODIUM 0.05 MG/1
TABLET ORAL
Qty: 30 TABLET | Refills: 2 | Status: SHIPPED | OUTPATIENT
Start: 2024-06-23

## 2024-06-23 RX ORDER — FLUTICASONE PROPIONATE 50 MCG
SPRAY, SUSPENSION (ML) NASAL
Qty: 16 ML | Refills: 1 | Status: SHIPPED | OUTPATIENT
Start: 2024-06-23

## 2024-06-26 ENCOUNTER — SOCIAL WORK (OUTPATIENT)
Dept: BEHAVIORAL/MENTAL HEALTH CLINIC | Facility: CLINIC | Age: 54
End: 2024-06-26
Payer: COMMERCIAL

## 2024-06-26 DIAGNOSIS — F41.8 DEPRESSION WITH ANXIETY: Primary | ICD-10-CM

## 2024-06-26 PROCEDURE — 90837 PSYTX W PT 60 MINUTES: CPT

## 2024-06-26 NOTE — PSYCH
"Behavioral Health Psychotherapy Progress Note    Psychotherapy Provided: Individual Psychotherapy     1. Depression with anxiety            Goals addressed in session: Goal 1 and Goal 2     DATA: Shailesh presents today casually dressed and in pleasant mood. We processed his week. He was able to attend some events with old friends last week that they do each year. He states he has made some changes in how he approaches his spouse to discuss things and has noticed her response is softer. He is somewhat conflicted with knowing the response is better, but still wanting to not have to change his reactions in this way. We spent time discussing this dialectic today. His spouse was going to attend today but a conflict arose and she was unable. Explained he is welcome to bring her with whenever he wishes to do so. Shailesh also noted that he is finding it harder to maintain healthy lifestyle changes that he once found easy to do when he had specific athletic goals. We discussed him working on identifying some goal to work towards in this regard again to keep his motivation up.  During this session, this clinician used the following therapeutic modalities: Client-centered Therapy, Cognitive Behavioral Therapy, and Motivational Interviewing    Substance Abuse was not addressed during this session. If the client is diagnosed with a co-occurring substance use disorder, please indicate any changes in the frequency or amount of use: na. Stage of change for addressing substance use diagnoses: No substance use/Not applicable    ASSESSMENT:  Shailesh Butts presents with a Euthymic/ normal mood.     his affect is Normal range and intensity, which is congruent, with his mood and the content of the session. The client has made progress on their goals.     Shailesh Butts presents with a none risk of suicide, none risk of self-harm, and none risk of harm to others.    For any risk assessment that surpasses a \"low\" rating, a safety plan must be " developed.    A safety plan was indicated: no  If yes, describe in detail n/a same plan as previously/ no changes    PLAN: Between sessions, Shailesh Butts will work on personal goal setting and continue with reaction changes with spouse. At the next session, the therapist will use Client-centered Therapy, Cognitive Behavioral Therapy, Dialectical Behavior Therapy, Mindfulness-based Strategies, and Motivational Interviewing to address reactions/ anger/ personal goal setting he was working on.    Behavioral Health Treatment Plan and Discharge Planning: Shailesh Butts is aware of and agrees to continue to work on their treatment plan. They have identified and are working toward their discharge goals. yes    Visit start and stop times:    06/26/24  Start Time: 1225  Stop Time: 1325  Total Visit Time: 60 minutes

## 2024-07-03 ENCOUNTER — TELEPHONE (OUTPATIENT)
Age: 54
End: 2024-07-03

## 2024-07-03 NOTE — TELEPHONE ENCOUNTER
Patient states he emailed to cancel appointment for today. He would like a response either via email or phone call just to confirm cancellation.

## 2024-07-31 ENCOUNTER — SOCIAL WORK (OUTPATIENT)
Dept: BEHAVIORAL/MENTAL HEALTH CLINIC | Facility: CLINIC | Age: 54
End: 2024-07-31
Payer: COMMERCIAL

## 2024-07-31 DIAGNOSIS — F41.8 DEPRESSION WITH ANXIETY: Primary | ICD-10-CM

## 2024-07-31 PROCEDURE — 90837 PSYTX W PT 60 MINUTES: CPT

## 2024-07-31 NOTE — PSYCH
Behavioral Health Psychotherapy Progress Note    Psychotherapy Provided: Individual Psychotherapy     1. Depression with anxiety            Goals addressed in session: Goal 1     DATA: Shailesh presents today casually dressed and in pleasant mood. We processed his last several weeks. He was able to go on vacation with extended family for several weeks, and also do several activities with his wife. These all went well. Daughter got engaged on their vacation and we processed some emotions related to this.  He was able to talk through with his wife and daughter some of his feelings about remembering having to wear duct tape on his shoes as a child to go to school since they had little money. We began to explore today a root of fear he has and pressure he puts on himself about providing for his family. He expresses that he feels resentment about how money is spent, and that his work and the pressures he feels are not validated by family. He would like to bring spouse to next session; therapist agreed. Discussed empathy and the need to also apply this to his spouse as well. Challenged him to think this week on how his fears drive his behaviors regarding work, money, and family interactions.  During this session, this clinician used the following therapeutic modalities: Client-centered Therapy, Cognitive Behavioral Therapy, and Motivational Interviewing    Substance Abuse was not addressed during this session. If the client is diagnosed with a co-occurring substance use disorder, please indicate any changes in the frequency or amount of use: n/a. Stage of change for addressing substance use diagnoses: No substance use/Not applicable    ASSESSMENT:  Shailesh Butts presents with a Euthymic/ normal mood.     his affect is Normal range and intensity, which is congruent, with his mood and the content of the session. The client has made progress on their goals.     Shailesh Butts presents with a none risk of suicide, none risk of  "self-harm, and none risk of harm to others.    For any risk assessment that surpasses a \"low\" rating, a safety plan must be developed.    A safety plan was indicated: no  If yes, describe in detail n/a same safety plan as previously/ no changes.    PLAN: Between sessions, Shailesh Butts will consider how his fear drives responses. At the next session, the therapist will use Client-centered Therapy, Cognitive Behavioral Therapy, Dialectical Behavior Therapy, Mindfulness-based Strategies, and Motivational Interviewing to address possible family session with patient and spouse.    Behavioral Health Treatment Plan and Discharge Planning: Shailesh Butts is aware of and agrees to continue to work on their treatment plan. They have identified and are working toward their discharge goals. yes    Visit start and stop times:    07/31/24  Start Time: 0900  Stop Time: 0955  Total Visit Time: 55 minutes  "

## 2024-08-07 ENCOUNTER — SOCIAL WORK (OUTPATIENT)
Dept: BEHAVIORAL/MENTAL HEALTH CLINIC | Facility: CLINIC | Age: 54
End: 2024-08-07
Payer: COMMERCIAL

## 2024-08-07 DIAGNOSIS — F41.8 DEPRESSION WITH ANXIETY: Primary | ICD-10-CM

## 2024-08-07 PROCEDURE — 90847 FAMILY PSYTX W/PT 50 MIN: CPT

## 2024-08-07 NOTE — PSYCH
"Behavioral Health Psychotherapy Progress Note    Psychotherapy Provided: Family Therapy    1. Depression with anxiety            Goals addressed in session: Goal 1     DATA: Shailesh presents today and brought his wife Danielle with him for session. We processed what has been going on in their relationship.  They are doing well when they are alone but most of their issues surround their adult son Adrian (age 23) who is at home. They describe various mental health issues he has had in the past that have affected the way they parented him and now affects their current relationships. We explored various insights into their approaches with Adrian and with each other.     During this session, this clinician used the following therapeutic modalities: Client-centered Therapy and Family Therapy    Substance Abuse was not addressed during this session. If the client is diagnosed with a co-occurring substance use disorder, please indicate any changes in the frequency or amount of use: n/a. Stage of change for addressing substance use diagnoses: No substance use/Not applicable    ASSESSMENT:  Shailesh Butts presents with a Euthymic/ normal mood.     his affect is Normal range and intensity, which is congruent, with his mood and the content of the session. The client has made progress on their goals.     Shailesh Butts presents with a none risk of suicide, none risk of self-harm, and none risk of harm to others.    For any risk assessment that surpasses a \"low\" rating, a safety plan must be developed.    A safety plan was indicated: no  If yes, describe in detail n/a    PLAN: Between sessions, Shailesh Butts will work on setting plan with spouse for their time together and on plans for them approaching adult son with issues to resolve together. At the next session, the therapist will use Client-centered Therapy, Cognitive Behavioral Therapy, and Motivational Interviewing to address anxiety and stress.    Behavioral Health Treatment Plan " and Discharge Planning: Shailesh Butts is aware of and agrees to continue to work on their treatment plan. They have identified and are working toward their discharge goals. yes    Visit start and stop times:    08/07/24  Start Time: 1600  Stop Time: 1705  Total Visit Time: 65 minutes

## 2024-08-21 DIAGNOSIS — R09.82 POST-NASAL DRAINAGE: ICD-10-CM

## 2024-08-21 RX ORDER — FLUTICASONE PROPIONATE 50 MCG
SPRAY, SUSPENSION (ML) NASAL
Qty: 16 ML | Refills: 1 | Status: SHIPPED | OUTPATIENT
Start: 2024-08-21

## 2024-08-23 ENCOUNTER — SOCIAL WORK (OUTPATIENT)
Dept: BEHAVIORAL/MENTAL HEALTH CLINIC | Facility: CLINIC | Age: 54
End: 2024-08-23
Payer: COMMERCIAL

## 2024-08-23 DIAGNOSIS — F41.8 DEPRESSION WITH ANXIETY: Primary | ICD-10-CM

## 2024-08-23 PROCEDURE — 90837 PSYTX W PT 60 MINUTES: CPT

## 2024-08-23 NOTE — PSYCH
"Behavioral Health Psychotherapy Progress Note    Psychotherapy Provided: Individual Psychotherapy     1. Depression with anxiety            Goals addressed in session: Goal 1     DATA: Shailesh presents today casually dressed and in pleasant mood. We processed the last few week. In particular we processed some of the couples session that was done last time as his spouse attended. They have been settling into fall schedules. Discussed backgrounds in their families of origin and differences that may lead to his spouse's anxiety about certain issues to help him understand and empathize more with her in their communication.  During this session, this clinician used the following therapeutic modalities: Client-centered Therapy, Cognitive Behavioral Therapy, and Motivational Interviewing    Substance Abuse was not addressed during this session. If the client is diagnosed with a co-occurring substance use disorder, please indicate any changes in the frequency or amount of use: n/a. Stage of change for addressing substance use diagnoses: No substance use/Not applicable    ASSESSMENT:  Shailesh Butts presents with a Euthymic/ normal mood.     his affect is Normal range and intensity, which is congruent, with his mood and the content of the session. The client has made progress on their goals.     Shailesh Butts presents with a none risk of suicide, none risk of self-harm, and none risk of harm to others.    For any risk assessment that surpasses a \"low\" rating, a safety plan must be developed.    A safety plan was indicated: no  If yes, describe in detail n/a same safety plan as previously/ no changes.    PLAN: Between sessions, Shailesh Butts will continue working on boundaries with family particularly adult son. At the next session, the therapist will use Client-centered Therapy, Cognitive Behavioral Therapy, Dialectical Behavior Therapy, and Motivational Interviewing to address anxiety.    Behavioral Health Treatment Plan " and Discharge Planning: Shailesh Butts is aware of and agrees to continue to work on their treatment plan. They have identified and are working toward their discharge goals. yes    Visit start and stop times:    08/23/24  Start Time: 1100  Stop Time: 1155  Total Visit Time: 55 minutes

## 2024-09-14 DIAGNOSIS — E03.9 ACQUIRED HYPOTHYROIDISM: ICD-10-CM

## 2024-09-14 DIAGNOSIS — R09.82 POST-NASAL DRAINAGE: ICD-10-CM

## 2024-09-14 DIAGNOSIS — K21.00 GASTROESOPHAGEAL REFLUX DISEASE WITH ESOPHAGITIS WITHOUT HEMORRHAGE: ICD-10-CM

## 2024-09-16 RX ORDER — FLUTICASONE PROPIONATE 50 MCG
SPRAY, SUSPENSION (ML) NASAL
Qty: 48 G | Refills: 1 | Status: SHIPPED | OUTPATIENT
Start: 2024-09-16

## 2024-09-16 RX ORDER — PANTOPRAZOLE SODIUM 40 MG/1
40 TABLET, DELAYED RELEASE ORAL DAILY
Qty: 90 TABLET | Refills: 1 | Status: SHIPPED | OUTPATIENT
Start: 2024-09-16

## 2024-09-17 RX ORDER — LEVOTHYROXINE SODIUM 50 UG/1
TABLET ORAL
Qty: 90 TABLET | Refills: 1 | Status: SHIPPED | OUTPATIENT
Start: 2024-09-17

## 2024-09-18 ENCOUNTER — SOCIAL WORK (OUTPATIENT)
Dept: BEHAVIORAL/MENTAL HEALTH CLINIC | Facility: CLINIC | Age: 54
End: 2024-09-18

## 2024-09-18 DIAGNOSIS — F41.8 DEPRESSION WITH ANXIETY: Primary | ICD-10-CM

## 2024-09-18 NOTE — PSYCH
"Behavioral Health Psychotherapy Progress Note    Psychotherapy Provided: Individual Psychotherapy     1. Depression with anxiety            Goals addressed in session: Goal 1     DATA: Shailesh presents today casually dressed and in a pleasant mood. We processed the past several weeks. He continues to feel frustrated with his wife in general and recognizes his reasons of resentment. He ultimately describes feeling unseen and unheard and unappreciated in his view. We spent some time discussing communication strategies. We also discussed the different behavioral patterns that people have. Shailesh is very much a directive/ leader type whereas Danielle is somewhat opposite. When asked how he would like Danielle to behave differently, Shailesh struggles to describe without going into the past and why he feels this way. He is not able to articulate exactly what behaviors he would desire to see her change. I have asked him to give this more thought and we will address more in future sessions. 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   During this session, this clinician used the following therapeutic modalities: Client-centered Therapy, Cognitive Behavioral Therapy, and Mindfulness-based Strategies    Substance Abuse was not addressed during this session. If the client is diagnosed with a co-occurring substance use disorder, please indicate any changes in the frequency or amount of use: n/a. Stage of change for addressing substance use diagnoses: No substance use/Not applicable    ASSESSMENT:  Shailesh Butts presents with a Euthymic/ normal mood.     his affect is Normal range and intensity, which is congruent, with his mood and the content of the session. The client has made progress on their goals.     Shailesh Butts presents with a none risk of suicide, none risk of self-harm, and none risk of harm to others.    For any risk assessment that surpasses a \"low\" rating, a " safety plan must be developed.    A safety plan was indicated: no  If yes, describe in detail n/a    PLAN: Between sessions, Shailesh Butts will clarify thoughts on his expectations for spouse. At the next session, the therapist will use Client-centered Therapy, Cognitive Behavioral Therapy, Dialectical Behavior Therapy, and Motivational Interviewing to address his realistic expectations of his spouse, and emotional depletion after work, anxiety overall.    Behavioral Health Treatment Plan and Discharge Planning: Shailesh Butts is aware of and agrees to continue to work on their treatment plan. They have identified and are working toward their discharge goals. yes    Visit start and stop times:    09/18/24  Start Time: 1010  Stop Time: 1103  Total Visit Time: 53 minutes

## 2024-10-09 ENCOUNTER — SOCIAL WORK (OUTPATIENT)
Dept: BEHAVIORAL/MENTAL HEALTH CLINIC | Facility: CLINIC | Age: 54
End: 2024-10-09
Payer: COMMERCIAL

## 2024-10-09 DIAGNOSIS — F41.8 DEPRESSION WITH ANXIETY: Primary | ICD-10-CM

## 2024-10-09 PROCEDURE — 90847 FAMILY PSYTX W/PT 50 MIN: CPT

## 2024-10-09 NOTE — PSYCH
Behavioral Health Psychotherapy Progress Note    Psychotherapy Provided: Individual Psychotherapy     1. Depression with anxiety            Goals addressed in session: Goal 1     DATA: Shailesh presents today with his wife, Danielle. Shailesh remains frustrated with numerous things. He describes multiple situations where he is irritated with things Danielle is able to do while he is working. He ultimately admits to having underlying resentment about feeling the pressure of having to provide.  He is somewhat hyperfixated on money overall. He admits to fear of not having enough money, and remembers wearing sneakers to school as a child that were duck-taped together. 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 He could not name a date recently where he was fully off. Danielle states that he always is on the phone answering texts or calls no matter the time. 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 He admits he always wants to be in control and that he does not trust anyone else enough to do the work. Danielle contributed that she would rather have Shailesh than money and that she is willing to discuss how they are allocating their finances if it will help him to take any time off.   During this session, this clinician used the following therapeutic modalities: Client-centered Therapy and Motivational Interviewing    Substance Abuse was not addressed during this session. If the client is diagnosed with a co-occurring substance use disorder, please indicate any changes in the frequency or amount of use: n/a. Stage of change for addressing substance use diagnoses: No substance use/Not applicable    ASSESSMENT:  Shailesh Butts presents with a Euthymic/ normal mood.     his affect is Normal range and intensity, which is congruent, with his mood and the content of the session. The  "client has made progress on their goals.     Shailesh Butts presents with a none risk of suicide, none risk of self-harm, and none risk of harm to others.    For any risk assessment that surpasses a \"low\" rating, a safety plan must be developed.    A safety plan was indicated: no  If yes, describe in detail n/a    PLAN: Between sessions, Shailesh Butts will discuss with spouse finances, and try to take a few hours off work each week. At the next session, the therapist will use Client-centered Therapy, Cognitive Behavioral Therapy, Cognitive Processing Therapy, Dialectical Behavior Therapy, and Motivational Interviewing to address stress/anxiety/over-working.    Behavioral Health Treatment Plan and Discharge Planning: Shailesh Butts is aware of and agrees to continue to work on their treatment plan. They have identified and are working toward their discharge goals. yes    Visit start and stop times:    10/09/24  Start Time: 1605  Stop Time: 1705  Total Visit Time: 60 minutes  "

## 2024-10-23 ENCOUNTER — SOCIAL WORK (OUTPATIENT)
Dept: BEHAVIORAL/MENTAL HEALTH CLINIC | Facility: CLINIC | Age: 54
End: 2024-10-23
Payer: COMMERCIAL

## 2024-10-23 DIAGNOSIS — F41.8 DEPRESSION WITH ANXIETY: Primary | ICD-10-CM

## 2024-10-23 PROCEDURE — 90837 PSYTX W PT 60 MINUTES: CPT

## 2024-10-23 NOTE — PSYCH
"Behavioral Health Psychotherapy Progress Note    Psychotherapy Provided: Individual Psychotherapy     1. Depression with anxiety            Goals addressed in session: Goal 1     DATA: Shailesh presents today casually dressed and in pleasant mood. We processed the last few weeks. He did take a day off this past week, and did distance himself from answering work messages during that time. 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 He also had a good conversation with his adult son, Adrian, about expenses and his direction for the future.  Today we worked on Shailesh continuing to stop and think before he reacts in how he is going to communicate with son and spouse. He was able to do this well in the conversation he had with Adrian.  During this session, this clinician used the following therapeutic modalities: Client-centered Therapy, Cognitive Behavioral Therapy, Cognitive Processing Therapy, and Motivational Interviewing    Substance Abuse was not addressed during this session. If the client is diagnosed with a co-occurring substance use disorder, please indicate any changes in the frequency or amount of use: n/a. Stage of change for addressing substance use diagnoses: No substance use/Not applicable    ASSESSMENT:  Shailesh Butts presents with a Euthymic/ normal mood.     his affect is Normal range and intensity, which is congruent, with his mood and the content of the session. The client has made progress on their goals.     Shailesh Butts presents with a none risk of suicide, none risk of self-harm, and none risk of harm to others.    For any risk assessment that surpasses a \"low\" rating, a safety plan must be developed.    A safety plan was indicated: no  If yes, describe in detail n/a same plan as previously/ no changes    PLAN: Between sessions, Shailesh Butts will continue to work on taking time for self-care. At the next session, the therapist " will use Client-centered Therapy, Cognitive Behavioral Therapy, Cognitive Processing Therapy, Dialectical Behavior Therapy, and Motivational Interviewing to address anxiety/depression symptoms.    Behavioral Health Treatment Plan and Discharge Planning: Shailesh Butts is aware of and agrees to continue to work on their treatment plan. They have identified and are working toward their discharge goals. yes    Visit start and stop times:    10/23/24  Start Time: 1155  Stop Time: 1254  Total Visit Time: 59 minutes

## 2024-10-30 ENCOUNTER — SOCIAL WORK (OUTPATIENT)
Dept: BEHAVIORAL/MENTAL HEALTH CLINIC | Facility: CLINIC | Age: 54
End: 2024-10-30
Payer: COMMERCIAL

## 2024-10-30 DIAGNOSIS — F41.8 DEPRESSION WITH ANXIETY: Primary | ICD-10-CM

## 2024-10-30 PROCEDURE — 90847 FAMILY PSYTX W/PT 50 MIN: CPT

## 2024-10-30 NOTE — PSYCH
"Behavioral Health Psychotherapy Progress Note    Psychotherapy Provided: Family Therapy    1. Depression with anxiety            Goals addressed in session: Goal 1     DATA: Shailesh presents today with his wife Danielle.  He is very frustrated about what he perceives as them being totally opposite in his thinking. 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 She says since then he is very irritable and angry, and that he constantly digs at her and points out all her flaws. Shailesh demonstrated this behavior multiple times in today's session and in previous sessions where he points out all the flaws anyone else has. Shailesh admits he is depressed but declines wanting any discussion about medication. Says he tried this in the past. He says he just feels irritable and thinks that Danielle should be doing \"fair share\" of everything. We discussed how his thinking is sometimes very all or nothing thinking and he agreed. Danielle and Shailesh agreed to try turing to more positives by taking worksheets on couples strength exploration and gratitude couple journals to try.  Shailesh has an individual session next week.  During this session, this clinician used the following therapeutic modalities: Client-centered Therapy, Cognitive Behavioral Therapy, Cognitive Processing Therapy, and Motivational Interviewing    Substance Abuse was not addressed during this session. If the client is diagnosed with a co-occurring substance use disorder, please indicate any changes in the frequency or amount of use: n/a. Stage of change for addressing substance use diagnoses: No substance use/Not applicable    ASSESSMENT:  Shailesh Butts presents with a Euthymic/ normal mood.     his affect is Normal range and intensity, which is congruent, with his mood and the content of the session. The client has made progress on their goals.     Shailesh Butts presents with a none risk of suicide, none risk " "of self-harm, and none risk of harm to others.    For any risk assessment that surpasses a \"low\" rating, a safety plan must be developed.    A safety plan was indicated: no  If yes, describe in detail n/a    PLAN: Between sessions, Shailesh Butts will complete couples gratitude journal/strength exploration. At the next session, the therapist will use Client-centered Therapy, Cognitive Behavioral Therapy, Cognitive Processing Therapy, Dialectical Behavior Therapy, and Motivational Interviewing to address emotional regulation.    Behavioral Health Treatment Plan and Discharge Planning: Shailesh Butts is aware of and agrees to continue to work on their treatment plan. They have identified and are working toward their discharge goals. yes    Visit start and stop times:    10/30/24  Start Time: 1603  Stop Time: 1700  Total Visit Time: 57 minutes  "

## 2024-11-06 ENCOUNTER — SOCIAL WORK (OUTPATIENT)
Dept: BEHAVIORAL/MENTAL HEALTH CLINIC | Facility: CLINIC | Age: 54
End: 2024-11-06
Payer: COMMERCIAL

## 2024-11-06 DIAGNOSIS — F41.8 DEPRESSION WITH ANXIETY: Primary | ICD-10-CM

## 2024-11-06 PROCEDURE — 90837 PSYTX W PT 60 MINUTES: CPT

## 2024-11-06 NOTE — BH TREATMENT PLAN
"Outpatient Behavioral Health Psychotherapy Treatment Plan     Shailesh Butts  1970      Date of Initial Psychotherapy Assessment: 5/22/2024   Date of Current Treatment Plan: 11/6/2024  Treatment Plan Target Date: TBD  Treatment Plan Expiration Date: 5/4/2025     Diagnosis:   1. Depression with anxiety                Area(s) of Need: Increased frustration, depressed mood.     Long Term Goal 1 (in the client's own words): Less frustrated and angry.     Stage of Change: Contemplation     Target Date for completion: TBD             Anticipated therapeutic modalities: DBT, CBT, MI, mindfulness             People identified to complete this goal: Shailesh and therapist                     Objective 1: (identify the means of measuring success in meeting the objective): Shailesh will learn and utilize 3 DBT and/or grounding techniques over the next 6 months when feeling frustrated or anxious.  Update 11/6/2024 - objective continues                    Objective 2: (identify the means of measuring success in meeting the objective): (Objective revised 11/6/2024) Shailesh will learn and utilize assertive/ not aggressive communication techniques such as \"I statements\" and ZOFIA skills to communicate needs with family members.             Objective 3: (Objective revised 11/6/2024) - Shailesh will stop and pause, before engaging in conversations that are emotionally charged.     Objective 4: (New objective 11/6/2024) - Over the next 6 months, Shailesh and therapist will explore and work on processing situations and experiences both past and present that contribute to his anxiety and depression.    Objective 5: (New objective 11/6/2024) - Over the next 6 months, Shailesh will learn and verbalize understanding of how brain processes anxiety triggers (fight/flight/freeze) response, and the physiological response of her body when this occurs.    Objective 6: (New objective 11/6/2024) - Shailesh and therapist will explore areas in daily experiences " "that in his control, and recognize areas that are not in his control over the next 6 months.     Objective 7: (New objective 11/6/2024) - Over the next 6 months, Shailesh will learn common thought pattern errors (cognitive distortions) and write these down to review when he recognizes them in her thinking.              Objective 8: Shailesh will learn and utilize thought trial technique for cognitive distortions as he recognizes them over the next 6 months.                I am currently under the care of a [facility] psychiatric provider: yes     My [facility] psychiatric provider is: n/a     I am currently taking psychiatric medications:  no behavioral health med prescribed at this time     I feel that I will be ready for discharge from mental health care when I reach the following (measurable goal/objective): \"I will wake up in the morning looking forward to the day.\"     For children and adults who have a legal guardian:          Has there been any change to custody orders and/or guardianship status? NA. If yes, attach updated documentation.     I have created my Crisis Plan and have been offered a copy of this plan     Behavioral Health Treatment Plan [facility]: Diagnosis and Treatment Plan explained to Shailesh Villanuevaiff Benjamín acknowledges an understanding of their diagnosis. Shailesh Butts agrees to this treatment plan.     I have been offered a copy of this Treatment Plan. yes     "

## 2024-11-06 NOTE — PSYCH
"Behavioral Health Psychotherapy Progress Note    Psychotherapy Provided: Individual Psychotherapy     1. Depression with anxiety            Goals addressed in session: Goal 1     DATA: Shailesh presents today casually dressed. He is pleasant, but admits it was a difficult week. We processed the week. He expresses that he did try some of the couples gratitude that I gave him to do. He remembered that we spoke about having empathy for his spouse. We talked about the difference between empathy and sympathy. Shailesh admits that he struggles with empathy at times. We worked on examples of how to jump in the shoes of others today, particularly his spouse. We also updated his treatment plan today.   During this session, this clinician used the following therapeutic modalities: Client-centered Therapy, Cognitive Behavioral Therapy, Cognitive Processing Therapy, and Motivational Interviewing    Substance Abuse was not addressed during this session. If the client is diagnosed with a co-occurring substance use disorder, please indicate any changes in the frequency or amount of use: n/a. Stage of change for addressing substance use diagnoses: No substance use/Not applicable    ASSESSMENT:  Shailesh Butts presents with a Euthymic/ normal mood.     his affect is Normal range and intensity, which is congruent, with his mood and the content of the session. The client has made progress on their goals.     Shailesh Butts presents with a none risk of suicide, none risk of self-harm, and none risk of harm to others.    For any risk assessment that surpasses a \"low\" rating, a safety plan must be developed.    A safety plan was indicated: no  If yes, describe in detail n/a    PLAN: Between sessions, Shailesh Butts will continue to work on ideas surrounding empathy for others. At the next session, the therapist will use Client-centered Therapy, Cognitive Behavioral Therapy, Cognitive Processing Therapy, Dialectical Behavior Therapy, and " Motivational Interviewing to address anxiety depression/ empathy.    Behavioral Health Treatment Plan and Discharge Planning: Shailesh Butts is aware of and agrees to continue to work on their treatment plan. They have identified and are working toward their discharge goals. yes    Visit start and stop times:    11/06/24  Start Time: 1200  Stop Time: 1253  Total Visit Time: 53 minutes

## 2024-12-04 ENCOUNTER — SOCIAL WORK (OUTPATIENT)
Dept: BEHAVIORAL/MENTAL HEALTH CLINIC | Facility: CLINIC | Age: 54
End: 2024-12-04
Payer: COMMERCIAL

## 2024-12-04 DIAGNOSIS — F41.8 DEPRESSION WITH ANXIETY: Primary | ICD-10-CM

## 2024-12-04 PROCEDURE — 90837 PSYTX W PT 60 MINUTES: CPT

## 2024-12-04 NOTE — PSYCH
"Behavioral Health Psychotherapy Progress Note    Psychotherapy Provided: Individual Psychotherapy     1. Depression with anxiety            Goals addressed in session: Goal 1     DATA: Shailesh presents today casually dressed and in a pleasant mood. We processed the past month. He reports that overall he feels he is self-regulating better in reactions to family, but that whenever he does make a comment that is harsh they accuse him of \"being the way you were\". Encouraged him that change takes time, and it also takes time for family and friends to view someone differently. Reviewed what is in and out of his control in regards to this. 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 We spent some time discussing areas of interest and experience he has and how he can start reframing his current situation or begin seeking a change.  During this session, this clinician used the following therapeutic modalities: Client-centered Therapy, Cognitive Behavioral Therapy, Cognitive Processing Therapy, and Motivational Interviewing    Substance Abuse was not addressed during this session. If the client is diagnosed with a co-occurring substance use disorder, please indicate any changes in the frequency or amount of use: n/a. Stage of change for addressing substance use diagnoses: No substance use/Not applicable    ASSESSMENT:  Shailesh Butts presents with a Euthymic/ normal mood.     his affect is Normal range and intensity, which is congruent, with his mood and the content of the session. The client has made progress on their goals.     Shailesh Butts presents with a none risk of suicide, none risk of self-harm, and none risk of harm to others.    For any risk assessment that surpasses a \"low\" rating, a safety plan must be developed.    A safety plan was indicated: no  If yes, describe in detail n/a    PLAN: Between sessions, " Shailesh Butts will continue self-care. At the next session, the therapist will use Client-centered Therapy, Cognitive Behavioral Therapy, Cognitive Processing Therapy, Dialectical Behavior Therapy, and Motivational Interviewing to address anxiety/depression.    Behavioral Health Treatment Plan and Discharge Planning: Shailesh Butts is aware of and agrees to continue to work on their treatment plan. They have identified and are working toward their discharge goals. yes    Visit start and stop times:    12/04/24  Start Time: 1200  Stop Time: 1254  Total Visit Time: 54 minutes

## 2024-12-18 ENCOUNTER — SOCIAL WORK (OUTPATIENT)
Dept: BEHAVIORAL/MENTAL HEALTH CLINIC | Facility: CLINIC | Age: 54
End: 2024-12-18
Payer: COMMERCIAL

## 2024-12-18 DIAGNOSIS — F41.8 DEPRESSION WITH ANXIETY: Primary | ICD-10-CM

## 2024-12-18 PROCEDURE — 90837 PSYTX W PT 60 MINUTES: CPT

## 2024-12-18 NOTE — PSYCH
"Behavioral Health Psychotherapy Progress Note    Psychotherapy Provided: Individual Psychotherapy     1. Depression with anxiety            Goals addressed in session: Goal 1     DATA: Shailesh presents today neatly dressed and in a pleasant mood. We processed the last few weeks. He states he and Danielle are making progress with communication. He has been working on his reactions to her and Adrian and being calmer, allowing them time to respond. We talked his plans for the holidays and family gatherings.   During this session, this clinician used the following therapeutic modalities: Client-centered Therapy, Cognitive Behavioral Therapy, and Cognitive Processing Therapy    Substance Abuse was not addressed during this session. If the client is diagnosed with a co-occurring substance use disorder, please indicate any changes in the frequency or amount of use: n/a. Stage of change for addressing substance use diagnoses: No substance use/Not applicable    ASSESSMENT:  Shailesh Butts presents with a Euthymic/ normal mood.     his affect is Normal range and intensity, which is congruent, with his mood and the content of the session. The client has made progress on their goals.     Shailesh Butts presents with a minimal risk of suicide, minimal risk of self-harm, and none risk of harm to others.    For any risk assessment that surpasses a \"low\" rating, a safety plan must be developed.    A safety plan was indicated: no  If yes, describe in detail n/a same safety plan as previously/ no changes.    PLAN: Between sessions, Shailesh Butts will continue working on self-care plan. At the next session, the therapist will use Client-centered Therapy, Cognitive Behavioral Therapy, Cognitive Processing Therapy, Dialectical Behavior Therapy, and Motivational Interviewing to address depression.    Behavioral Health Treatment Plan and Discharge Planning: Shailesh Butts is aware of and agrees to continue to work on their treatment plan. They " have identified and are working toward their discharge goals. yes    Depression Follow-up Plan Completed: Not applicable    Visit start and stop times:    12/18/24  Start Time: 1200  Stop Time: 1256  Total Visit Time: 56 minutes

## 2025-01-06 ENCOUNTER — TELEPHONE (OUTPATIENT)
Dept: BEHAVIORAL/MENTAL HEALTH CLINIC | Facility: CLINIC | Age: 55
End: 2025-01-06

## 2025-01-06 NOTE — TELEPHONE ENCOUNTER
Patient contacted provider stating needed to cancel appt for 1/10/2024. He already has 1/22 scheduled also for next appt.

## 2025-01-22 ENCOUNTER — SOCIAL WORK (OUTPATIENT)
Dept: BEHAVIORAL/MENTAL HEALTH CLINIC | Facility: CLINIC | Age: 55
End: 2025-01-22
Payer: COMMERCIAL

## 2025-01-22 DIAGNOSIS — F41.8 DEPRESSION WITH ANXIETY: Primary | ICD-10-CM

## 2025-01-22 PROCEDURE — 90837 PSYTX W PT 60 MINUTES: CPT
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
## 2025-02-19 ENCOUNTER — SOCIAL WORK (OUTPATIENT)
Dept: BEHAVIORAL/MENTAL HEALTH CLINIC | Facility: CLINIC | Age: 55
End: 2025-02-19
Payer: COMMERCIAL

## 2025-02-19 DIAGNOSIS — F41.8 DEPRESSION WITH ANXIETY: Primary | ICD-10-CM

## 2025-02-19 PROCEDURE — 90837 PSYTX W PT 60 MINUTES: CPT
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
## 2025-03-27 ENCOUNTER — SOCIAL WORK (OUTPATIENT)
Dept: BEHAVIORAL/MENTAL HEALTH CLINIC | Facility: CLINIC | Age: 55
End: 2025-03-27
Payer: COMMERCIAL

## 2025-03-27 DIAGNOSIS — F41.8 DEPRESSION WITH ANXIETY: Primary | ICD-10-CM

## 2025-03-27 PROCEDURE — 90837 PSYTX W PT 60 MINUTES: CPT

## 2025-03-27 NOTE — PSYCH
"Behavioral Health Psychotherapy Progress Note    Psychotherapy Provided: Individual Psychotherapy     1. Depression with anxiety            Goals addressed in session: Goal 1     DATA: Shailesh presents today neatly dressed, pleasant, fully engaged in session, appropriate eye contact. We processed the past month. 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 He says Danielle's ankle is better and she is doing PT but able to drive herself again, so he has less there as well. Today he wanted to talk about some marital issues. Through discussion we were able to work on some empathy regarding what his wife has experienced for the past several years with menopause and provided some education to Shailesh about how that can contribute to some of the issues he was feeling. Discussed ways he can open communication more to express his need without coming across as aggressive to her.    During this session, this clinician used the following therapeutic modalities: Client-centered Therapy, Cognitive Behavioral Therapy, Cognitive Processing Therapy, and Motivational Interviewing    Substance Abuse was not addressed during this session. If the client is diagnosed with a co-occurring substance use disorder, please indicate any changes in the frequency or amount of use: n/a. Stage of change for addressing substance use diagnoses: No substance use/Not applicable    ASSESSMENT:  Shailesh Butts presents with a Euthymic/ normal mood.     his affect is Normal range and intensity, which is congruent, with his mood and the content of the session. The client has made progress on their goals.     Shailesh Butts presents with a minimal risk of suicide, minimal risk of self-harm, and none risk of harm to others.    For any risk assessment that surpasses a \"low\" rating, a safety plan must be developed.    A safety plan was indicated: no  If yes, describe in detail " n/a    PLAN: Between sessions, Shailesh Butts will keep working on opening more communication with spouse in a nonagressive way. At the next session, the therapist will use Client-centered Therapy, Cognitive Behavioral Therapy, Cognitive Processing Therapy, and Motivational Interviewing to address anxiety/ depression/ communication.    Behavioral Health Treatment Plan and Discharge Planning: Shailesh Butts is aware of and agrees to continue to work on their treatment plan. They have identified and are working toward their discharge goals. yes    Depression Follow-up Plan Completed: Not applicable    Visit start and stop times:    03/27/25  Start Time: 1001  Stop Time: 1054  Total Visit Time: 53 minutes

## 2025-04-08 ENCOUNTER — SOCIAL WORK (OUTPATIENT)
Dept: BEHAVIORAL/MENTAL HEALTH CLINIC | Facility: CLINIC | Age: 55
End: 2025-04-08
Payer: COMMERCIAL

## 2025-04-08 DIAGNOSIS — F41.8 DEPRESSION WITH ANXIETY: Primary | ICD-10-CM

## 2025-04-08 PROCEDURE — 90837 PSYTX W PT 60 MINUTES: CPT

## 2025-04-08 NOTE — PSYCH
"Behavioral Health Psychotherapy Progress Note    Psychotherapy Provided: Individual Psychotherapy     1. Depression with anxiety            Goals addressed in session: Goal 1     DATA: Shailesh presents today neatly dressed, pleasant, fully engaged in session, appropriate eye contact. We processed the past few weeks. We updated his safety plan and treatment plan today. 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 We worked on reinforcing this plan today to do 2 10-15 minutes things 2x/day for self-care. He is also considering getting a dog as this was always helpful for him for self-care (his other dog  back in Dec). We also updated his tx plan today.   During this session, this clinician used the following therapeutic modalities: Client-centered Therapy, Cognitive Behavioral Therapy, Cognitive Processing Therapy, and Motivational Interviewing    Substance Abuse was not addressed during this session. If the client is diagnosed with a co-occurring substance use disorder, please indicate any changes in the frequency or amount of use: n/a. Stage of change for addressing substance use diagnoses: No substance use/Not applicable    ASSESSMENT:  Shailesh Butts presents with a Euthymic/ normal mood.     his affect is Normal range and intensity, which is congruent, with his mood and the content of the session. The client has made progress on their goals.     Shailesh Butts presents with a minimal risk of suicide, minimal risk of self-harm, and none risk of harm to others.    For any risk assessment that surpasses a \"low\" rating, a safety plan must be developed.    A safety plan was indicated: no  If yes, describe in detail safety plan updated today as it was due.    PLAN: Between sessions, Shailesh Butts will work on self-care plan. At the next session, the therapist will use Client-centered Therapy, Cognitive Behavioral Therapy, Cognitive " Processing Therapy, and Motivational Interviewing to address anxiety/depression.    Behavioral Health Treatment Plan and Discharge Planning: Shailesh Butts is aware of and agrees to continue to work on their treatment plan. They have identified and are working toward their discharge goals. yes    Depression Follow-up Plan Completed: Not applicable    Visit start and stop times:    04/08/25  Start Time: 1000  Stop Time: 1055  Total Visit Time: 55 minutes

## 2025-04-08 NOTE — BH CRISIS PLAN
Client Name: Shailesh Butts       Client YOB: 1970    [facility] Safety Plan      Creation Date: 5/22/24 Update Date: 6/5/24   Created By: Daniela Smith Last Updated By: Daniela Smith      Step 1: Warning Signs:   Warning Signs   more irritable   ruminate on thoughts (overthinking)   feel unappreciated   start getting more tired, run down            Step 2: Internal Coping Strategies:   Internal Coping Strategies   play golf   read            Step 3: People and social settings that provide distraction:   Name Contact Information   Donald/ brother Number in phone   Lexx/ friend number in phone   Alan/ friend number in phone   Claus/ friend number in phone    [place]aces   [place]           Step 4: People whom I can ask for help during a crisis:      Name Contact Information    Danielle/ spouse number in phone      Step 5: Professionals or agencies I can contact during a crisis:      Clinican/Agency Name Phone Emergency Contact    Daniela Smith/ [facility] therapist [employer] Sched[facility]/ [phone]       Local Emergency Department Emergency Department Phone Emergency Department Address    [facility] [phone] [address]        Crisis Phone Numbers:   Suicide Prevention Lifeline: Call or Text  098 Crisis Text Line: Text HOME to 200-582   Please note: Some [facility] do not have a separate number for Child/Adolescent specific crisis. If your county is not listed under Child/Adolescent, please call the adult number for your county      Adult Crisis Numbers: Child/Adolescent Crisis Numbers   [facility]: [phone] [facility]: [phone]   [facility]: [phone] [facility]: [phone]   [facility]: [phone] [place], [place]: [phone]   [facility]: [phone] [place] [facility]: [phone]   [place] [facility]: [phone]   [facility]: [phone]   [facility]: [phone]   [place]  "Crisis Services: [phone] (daytime) [phone] (after hours, weekends, holidays)      Step 6: Making the environment safer (plan for lethal means safety):   Patient did not identify any lethal methods: Yes     Optional: What is most important to me and worth living for?   \"The love and respect  and appreciation of my family.\"         Mary Safety Plan. Carolyne Davison and Florencio Castillo. Used with permission of the authors.           "

## 2025-04-08 NOTE — BH TREATMENT PLAN
"Outpatient Behavioral Health Psychotherapy Treatment Plan     Shailesh Butts  1970      Date of Initial Psychotherapy Assessment: 5/22/2024   Date of Current Treatment Plan: 4/8/2025  Treatment Plan Target Date: TBD  Treatment Plan Expiration Date: 10/5/2025     Diagnosis:   1. Depression with anxiety                Area(s) of Need: Increased frustration, depressed mood.     Long Term Goal 1 (in the client's own words): Less frustrated and angry.     Stage of Change: Contemplation     Target Date for completion: TBD             Anticipated therapeutic modalities: DBT, CBT, MI, mindfulness, SF, client-centered             People identified to complete this goal: Shailesh and therapist                     Objective 1: (identify the means of measuring success in meeting the objective): Shailesh will learn and utilize 3 DBT and/or grounding techniques over the next 6 months when feeling frustrated or anxious.  Update 11/6/2024 - objective continues  (Update 4/8/2025: objective continues)                    Objective 2: (identify the means of measuring success in meeting the objective): (Objective revised 11/6/2024) Shailesh will learn and utilize assertive/ not aggressive communication techniques such as \"I statements\" and ZOFIA skills to communicate needs with family members.           (Update 4/8/2025: objective continues)    Objective 3: (Objective revised 11/6/2024) - Shailesh will stop and pause, before engaging in conversations that are emotionally charged.    (Update 4/8/2025: objective met)    Objective 4: (New objective 11/6/2024) - Over the next 6 months, Shailesh and therapist will explore and work on processing situations and experiences both past and present that contribute to his anxiety and depression.     Objective 5: (New objective 11/6/2024) - Over the next 6 months, Shailesh will learn and verbalize understanding of how brain processes anxiety triggers (fight/flight/freeze) response, and the physiological " "response of her body when this occurs.   (Update 4/8/2025: objective met)    Objective 6: (New objective 11/6/2024) - Shailesh and therapist will explore areas in daily experiences that in his control, and recognize areas that are not in his control over the next 6 months.    (Update 4/8/2025: objective continues)    Objective 7: (New objective 11/6/2024) - Over the next 6 months, Shailesh will learn common thought pattern errors (cognitive distortions) and write these down to review when he recognizes them in her thinking.     (Update 4/8/2025: objective continues)            Objective 8: Shailesh will learn and utilize thought trial technique for cognitive distortions as he recognizes them over the next 6 months.   (Update 4/8/2025: objective continues)              I am currently under the care of a [facility] psychiatric provider: yes     My [facility] psychiatric provider is: n/a     I am currently taking psychiatric medications:  no behavioral health med prescribed at this time     I feel that I will be ready for discharge from mental health care when I reach the following (measurable goal/objective): \"I will wake up in the morning looking forward to the day.\"     For children and adults who have a legal guardian:          Has there been any change to custody orders and/or guardianship status? NA. If yes, attach updated documentation.     I have created my Crisis Plan and have been offered a copy of this plan     Behavioral Health Treatment Plan [facility]: Diagnosis and Treatment Plan explained to Shailesh Butts acknowledges an understanding of their diagnosis. Shailesh Butts agrees to this treatment plan.     I have been offered a copy of this Treatment Plan. yes  "

## 2025-04-22 ENCOUNTER — OFFICE VISIT (OUTPATIENT)
Age: 55
End: 2025-04-22
Payer: COMMERCIAL

## 2025-04-22 VITALS
DIASTOLIC BLOOD PRESSURE: 78 MMHG | OXYGEN SATURATION: 97 % | WEIGHT: 208 LBS | SYSTOLIC BLOOD PRESSURE: 126 MMHG | BODY MASS INDEX: 29.78 KG/M2 | HEIGHT: 70 IN | TEMPERATURE: 96.4 F | HEART RATE: 71 BPM

## 2025-04-22 DIAGNOSIS — L82.1 SEBORRHEIC KERATOSIS: ICD-10-CM

## 2025-04-22 DIAGNOSIS — D18.01 CHERRY ANGIOMA: ICD-10-CM

## 2025-04-22 DIAGNOSIS — D22.9 MULTIPLE MELANOCYTIC NEVI: ICD-10-CM

## 2025-04-22 DIAGNOSIS — Z13.89 SCREENING FOR SKIN CONDITION: Primary | ICD-10-CM

## 2025-04-22 DIAGNOSIS — D23.39 ANGIOFIBROMA OF SKIN OF NOSE: ICD-10-CM

## 2025-04-22 PROCEDURE — 99203 OFFICE O/P NEW LOW 30 MIN: CPT | Performed by: STUDENT IN AN ORGANIZED HEALTH CARE EDUCATION/TRAINING PROGRAM

## 2025-04-22 NOTE — PATIENT INSTRUCTIONS
ANGIOFIBROMA  Assessment and Plan:  Based on a thorough discussion of this condition and the management approach to it (including a comprehensive discussion of the known risks, side effects and potential benefits of treatment), the patient (family) agrees to implement the following specific plan:  Reassured benign    What is an angiofibroma?    An angiofibroma is a benign (non-cancerous) tumor that is made of blood vessels and fibrous tissue. They usually appear as small, red bumps on the face, and are especially common over the nose and cheeks. They are common in patients with tuberous sclerosis (a genetic disorder that causes skin lesions, seizures, and mental problems).     A subtype of angiofibroma is known as juvenile nasopharyngeal angiofibroma (JNA), which affects adolescent boys between 7 to 19 years old. It is the most common benign tumor of the nasopharynx, but can act in a malignant manner by eroding through the surrounding sinuses, orbit, or cranial vault.     What causes angiofibromas?    There are no risk factors for single angiofibromas, but multiple ones are seen in genetic disorders such as tuberous sclerosis and neurofibromatosis. It results from a lack of inhibition of the mTOR cell growth pathway causing multiple benign tumors throughout the body. Both males and females are effected and without a racial preference. There are many subtypes of angiofibromas noted below:  Hypercellular Angiofibroma of Skin: In this type, the tumor shows increased cellularity (consisting of increased number of fibroblasts)  Clear Cell Angiofibroma of Skin: Here, the tumor has clear-to-foamy cell type  Pigmented Angiofibroma of Skin: This tumor has increased number of melanophages, which may be mistaken for a nevus/mole  Pleomorphic Angiofibroma of Skin: The tumor cells can have marked variation in nuclear size. This may resemble a malignant sarcoma resulting in a misdiagnosis  Granular Angiofibroma of Skin: The tumor  "cells consist of granular cytoplasm, which may be confused with a granular cell tumor  What are the symptoms of angiofibromas?     Angiofibromas are 0.1-0.5cm dome shaped, smooth, red, purple or skin colored bumps that are scattered across the face. They are generally asymptomatic and do not cause pain.     JNA can have a variety of symptoms depending on which facial structures it impinges on.   Nasal obstruction, nosebleeds, and runny nose   Facial deformities such as cheek swelling, drooping eyelids, bulging eyes, and difficulty with eye movement   Rarely, hearing loss, double vision, loss of smell    How do we diagnose angiofibromas?    Angiofibromas can usually be diagnosed with a complete history and physical examination. Your doctor may also choose to use a dermatoscope for closer examination of each individual tumor. Other possible tests include:   Wood's lamp examination using UV light to check for changes in skin pigmentation   Genetic analysis to look for associated genetic syndromes   Skin biopsy sent to a laboratory to be examine under a microscope   JNA - direct visualization, MRI/CT     What is the treatment for angiofibromas?    Angiofibromas are benign so they do not have to be removed, but treatment can be provided for cosmetic reasons. A couple options include:   Laser ablation using pulsed dye laser or fractional CO2  Topical rapamycin   Immunosuppressant that blocks the cell pathway causing angiofibromas   Off label use for prevention and treatment   Dermabrasion   Surgical excision for larger tumors   Treatment of the underlying genetic syndrome     JNA can be treated with a surgery called the Endoscopic Endonasal Approach, which is a minimally invasive technique using the nose and nasal cavities to reach the tumor.   Radiation therapy for tumors that cannot be surgically removed   Chemotherapy, flutamide, that blocks testosterone can reduce tumor size      MELANOCYTIC NEVI (\"Moles\")    Paste " "patient specific assessment and plan here     Melanocytic nevi (\"moles\") are tan or brown, raised or flat areas of the skin which have an increased number of melanocytes. Melanocytes are the cells in our body which make pigment and account for skin color.    Some moles are present at birth (I.e., \"congenital nevi\"), while others come up later in life (i.e., \"acquired nevi\").  The sun can stimulate the body to make more moles.  Sunburns are not the only thing that triggers more moles.  Chronic sun exposure can do it too.     Clinically distinguishing a healthy mole from melanoma may be difficult, even for experienced dermatologists. The \"ABCDE's\" of moles have been suggested as a means of helping to alert a person to a suspicious mole and the possible increased risk of melanoma.  The suggestions for raising alert are as follows:    Asymmetry: Healthy moles tend to be symmetric, while melanomas are often asymmetric.  Asymmetry means if you draw a line through the mole, the two halves do not match in color, size, shape, or surface texture. Asymmetry can be a result of rapid enlargement of a mole, the development of a raised area on a previously flat lesion, scaling, ulceration, bleeding or scabbing within the mole.  Any mole that starts to demonstrate \"asymmetry\" should be examined promptly by a board certified dermatologist.     Border: Healthy moles tend to have discrete, even borders.  The border of a melanoma often blends into the normal skin and does not sharply delineate the mole from normal skin.  Any mole that starts to demonstrate \"uneven borders\" should be examined promptly by a board certified dermatologist.     Color: Healthy moles tend to be one color throughout.  Melanomas tend to be made up of different colors ranging from dark black, blue, white, or red.  Any mole that demonstrates a color change should be examined promptly by a board certified dermatologist.     Diameter: Healthy moles tend to be " "smaller than 0.6 cm in size; an exception are \"congenital nevi\" that can be larger.  Melanomas tend to grow and can often be greater than 0.6 cm (1/4 of an inch, or the size of a pencil eraser). This is only a guideline, and many normal moles may be larger than 0.6 cm without being unhealthy.  Any mole that starts to change in size (small to bigger or bigger to smaller) should be examined promptly by a board certified dermatologist.     Evolving: Healthy moles tend to \"stay the same.\"  Melanomas may often show signs of change or evolution such as a change in size, shape, color, or elevation.  Any mole that starts to itch, bleed, crust, burn, hurt, or ulcerate or demonstrate a change or evolution should be examined promptly by a board certified dermatologist.      Dysplastic Nevi  Dysplastic moles are moles that fit the ABCDE rules of melanoma but are not identified as melanomas when examined under the microscope.  They may indicate an increased risk of melanoma in that person. If there is a family history of melanoma, most experts agree that the person may be at an increased risk for developing a melanoma.  Experts still do not agree on what dysplastic moles mean in patients without a personal or family history of melanoma.  Dysplastic moles are usually larger than common moles and have different colors within it with irregular borders. The appearance can be very similar to a melanoma. Biopsies of dysplastic moles may show abnormalities which are different from a regular mole.      Melanoma  Malignant melanoma is a type of skin cancer that can be deadly if it spreads throughout the body. The incidence of melanoma in the [place] is growing faster than any other cancer. Melanoma usually grows near the surface of the skin for a period of time, and then begins to grow deeper into the skin. Once it grows deeper into the skin, the risk of spread to other organs greatly increases. Therefore, early detection and " "removal of a malignant melanoma may result in a better chance at a complete cure; removal after the tumor has spread may not be as effective, leading to worse clinical outcomes such as death.    The true rate of nevus transformation into a melanoma is unknown. It has been estimated that the lifetime risk for any acquired melanocytic nevus on any 20-year-old individual transforming into melanoma by age 80 is 0.03% (1 in 3,164) for men and 0.009% (1 in 10,800) for women.     The appearance of a \"new mole\" remains one of the most reliable methods for identifying a malignant melanoma.  Occasionally, melanomas appear as rapidly growing, blue-black, dome-shaped bumps within a previous mole or previous area of normal skin.  Other times, melanomas are suspected when a mole suddenly appears or changes. Itching, burning, or pain in a pigmented lesion should increase suspicion, but most patients with early melanoma have no skin discomfort whatsoever.  Melanoma can occur anywhere on the skin, including areas that are difficult for self-examination. Many melanomas are first noticed by other family members.  Suspicious-looking moles may be removed for microscopic examination.       You may be able to prevent death from melanoma by doing two simple things:    Try to avoid unnecessary sun exposure and protect your skin when it is exposed to the sun.  People who live near the equator, people who have intermittent exposures to large amounts of sun, and people who have had sunburns in childhood or adolescence have an increased risk for melanoma. Sun sense and vigilant sun protection may be keys to helping to prevent melanoma.  We recommend wearing UPF-rated sun protective clothing and sunglasses whenever possible and applying a moisturizer-sunscreen combination product (SPF 50+) such as Neutrogena Daily Defense to sun exposed areas of skin at least three times a day.    Have your moles regularly examined by a board certified " "dermatologist AND by yourself or a family member/friend at home.  We recommend that you have your moles examined at least once a year by a board certified dermatologist.  Use your birthday as an annual reminder to have your \"Birthday Suit\" (I.e., your skin) examined; it is a nice birthday gift to yourself to know that your skin is healthy appearing!  Additionally, at-home self examinations may be helpful for detecting a possible melanoma.  Use the ABCDEs we discussed and check your moles once a month at home.        SEBORRHEIC KERATOSIS  A seborrheic keratosis is a harmless warty spot that appears during adult life as a common sign of skin aging.  Seborrheic keratoses can arise on any area of skin, covered or uncovered, with the usual exception of the palms and soles. They do not arise from mucous membranes. Seborrheic keratoses can have highly variable appearance.      Seborrheic keratoses are extremely common. It has been estimated that over 90% of adults over the age of 60 years have one or more of them. They occur in males and females of all races, typically beginning to erupt in the 30s or 40s. They are uncommon under the age of 20 years.  The precise cause of seborrhoeic keratoses is not known.  Seborrhoeic keratoses are considered degenerative in nature. As time goes by, seborrheic keratoses tend to become more numerous. Some people inherit a tendency to develop a very large number of them; some people may have hundreds of them.    The name \"seborrheic keratosis\" is misleading, because these lesions are not limited to a seborrhoeic distribution (scalp, mid-face, chest, upper back), nor are they formed from sebaceous glands, nor are they associated with sebum -- which is greasy.  Seborrheic keratosis may also be called \"SK,\" \"Seb K,\" \"basal cell papilloma,\" \"senile wart,\" or \"barnacle.\"      There is no easy way to remove multiple lesions on a single occasion.  Unless a specific lesion is \"inflamed\" and is " "causing pain or stinging/burning or is bleeding, most insurance companies do not authorize treatment.      ANGIOMA (\"CHERRY ANGIOMA\")  Prajapati angiomas markedly increase in number from about the age of 40, so it has been estimated that 75% of people over 75 years of age have them. Although they also called \"senile angiomas,\" they can occur in young people too - 5% of adolescents have been found to have them.     Cherry angiomas are very common in males and females of any age or race, with no difference in sexes or races affected. They are however more noticeable in white skin than in skin of colour.  There may be a family history of similar lesions. Eruptive (very large number appearing in a short period of time) cherry angiomas have been rarely reported to be associated with internal malignancy and pregnancy.   "

## 2025-04-22 NOTE — PROGRESS NOTES
"[facility] Note     Patient Name: Shailesh Butts  Encounter Date: 4/22/25       Have you been cared for by a [facility] Dermatologist in the last 3 years and, if so, which description applies to you? NO. I am considered a \"new\" patient and must complete all patient intake questions. I am not of child-bearing potential.     REVIEW OF SYSTEMS:  Have you recently had or currently have any of the following? Recent fever or chills? No  Any non-healing wound? No   PAST MEDICAL HISTORY:  Have you personally ever had or currently have any of the following?  If \"YES,\" then please provide more detail. Skin cancer (such as Melanoma, Basal Cell Carcinoma, Squamous Cell Carcinoma?  No  Tuberculosis, HIV/AIDS, Hepatitis B or C: No  Radiation Treatment No   HISTORY OF IMMUNOSUPPRESSION:   Do you have a history of any of the following:  Systemic Immunosuppression such as Diabetes, Biologic or Immunotherapy, Chemotherapy, Organ Transplantation, Bone Marrow Transplantation or Prednisone?  No     Answering \"YES\" requires the addition of the dotphrase \"IMMUNOSUPPRESSED\" as the first diagnosis of the patient's visit.   FAMILY HISTORY:  Any \"first degree relatives\" (parent, brother, sister, or child) with the following?    Skin Cancer, Pancreatic or Other Cancer? No   PATIENT EXPERIENCE:    Do you want the Dermatologist to perform a COMPLETE skin exam today including a clinical examination under the \"bra and underwear\" areas?  Yes  If necessary, do we have your permission to call and leave a detailed message on your Preferred Phone number that includes your specific medical information?  Yes      No Known Allergies     Current Outpatient Medications:   •  fluticasone (FLONASE) 50 mcg/act nasal spray, SPRAY 1 SPRAY INTO EACH NOSTRIL EVERY DAY, Disp: 48 g, Rfl: 1  •  levothyroxine 50 mcg tablet, TAKE 1 TABLET BY MOUTH EVERY DAY, Disp: 90 tablet, Rfl: 1  •  pantoprazole (PROTONIX) 40 mg tablet, TAKE 1 TABLET (40 MG TOTAL) " "BY MOUTH DAILY DO NOT START BEFORE JANUARY 1, 2024., Disp: 90 tablet, Rfl: 1  •  Azelastine HCl 137 MCG/SPRAY SOLN, 1 SPRAY INTO EACH NOSTRIL 2 (TWO) TIMES A DAY USE IN EACH NOSTRIL AS DIRECTED, Disp: 30 mL, Rfl: 1              Whom besides the patient is providing clinical information about today's encounter?   NO ADDITIONAL HISTORIAN (patient alone provided history)    Physical Exam and Assessment/Plan by Diagnosis:    MELANOCYTIC NEVI (\"Moles\")    Physical Exam:  Anatomic Location Affected: Mostly on sun-exposed areas of the trunk and extremities   Morphological Description:  Scattered, 1-4mm round to ovoid, symmetrical-appearing, even bordered, skin colored to dark brown macules/papules, mostly in sun-exposed areas  Pertinent Positives:  Pertinent Negatives:    Additional History of Present Condition:  present on exam     Assessment and Plan:  Based on a thorough discussion of this condition and the management approach to it (including a comprehensive discussion of the known risks, side effects and potential benefits of treatment), the patient (family) agrees to implement the following specific plan:  Provided handout with information regarding the ABCDE's of moles   Recommend routine skin exams every year    Sun avoidance, protective clothing (known as UPF clothing), and the use of at least SPF 30 sunscreens is advised. Sunscreen should be reapplied every two hours when outside.     SEBORRHEIC KERATOSIS; NON-INFLAMED    Physical Exam:  Anatomic Location Affected:  scattered across trunk, extremities,  face  Morphological Description:  Flat and raised, waxy, smooth to warty textured, yellow to brownish-grey to dark brown to blackish, discrete, \"stuck-on\" appearing papules.  Pertinent Positives:  Pertinent Negatives:    Additional History of Present Condition:  Patient reports new bumps on the skin.  Denies itch, burn, pain, bleeding or ulceration.  Present constantly; nothing seems to make it worse or better.  " "No prior treatment.      Assessment and Plan:  Based on a thorough discussion of this condition and the management approach to it (including a comprehensive discussion of the known risks, side effects and potential benefits of treatment), the patient (family) agrees to implement the following specific plan:  Reassured benign      ANGIOMA (\"CHERRY ANGIOMA\")    Physical Exam:  Anatomic Location: scattered across sun exposed areas of the trunk and extremities   Morphologic Description: Firm red to reddish-blue discrete papules  Pertinent Positives:  Pertinent Negatives:    Additional History of Present Condition:  Present on exam.     Assessment and Plan:  Reassured benign    ANGIOFIBROMA    Physical Exam:  Anatomic Location Affected:  nose   Morphological Description:  scar tissue   Pertinent Positives:  Pertinent Negatives:    Additional History of Present Condition:  present on exam     Assessment and Plan:  Based on a thorough discussion of this condition and the management approach to it (including a comprehensive discussion of the known risks, side effects and potential benefits of treatment), the patient (family) agrees to implement the following specific plan:  Reassured benign    What is an angiofibroma?    An angiofibroma is a benign (non-cancerous) tumor that is made of blood vessels and fibrous tissue. They usually appear as small, red bumps on the face, and are especially common over the nose and cheeks. They are common in patients with tuberous sclerosis (a genetic disorder that causes skin lesions, seizures, and mental problems).     A subtype of angiofibroma is known as juvenile nasopharyngeal angiofibroma (JNA), which affects adolescent boys between 7 to 19 years old. It is the most common benign tumor of the nasopharynx, but can act in a malignant manner by eroding through the surrounding sinuses, orbit, or cranial vault.     What causes angiofibromas?    There are no risk factors for single " angiofibromas, but multiple ones are seen in genetic disorders such as tuberous sclerosis and neurofibromatosis. It results from a lack of inhibition of the mTOR cell growth pathway causing multiple benign tumors throughout the body. Both males and females are effected and without a racial preference. There are many subtypes of angiofibromas noted below:  Hypercellular Angiofibroma of Skin: In this type, the tumor shows increased cellularity (consisting of increased number of fibroblasts)  Clear Cell Angiofibroma of Skin: Here, the tumor has clear-to-foamy cell type  Pigmented Angiofibroma of Skin: This tumor has increased number of melanophages, which may be mistaken for a nevus/mole  Pleomorphic Angiofibroma of Skin: The tumor cells can have marked variation in nuclear size. This may resemble a malignant sarcoma resulting in a misdiagnosis  Granular Angiofibroma of Skin: The tumor cells consist of granular cytoplasm, which may be confused with a granular cell tumor  What are the symptoms of angiofibromas?     Angiofibromas are 0.1-0.5cm dome shaped, smooth, red, purple or skin colored bumps that are scattered across the face. They are generally asymptomatic and do not cause pain.     JNA can have a variety of symptoms depending on which facial structures it impinges on.   Nasal obstruction, nosebleeds, and runny nose   Facial deformities such as cheek swelling, drooping eyelids, bulging eyes, and difficulty with eye movement   Rarely, hearing loss, double vision, loss of smell    How do we diagnose angiofibromas?    Angiofibromas can usually be diagnosed with a complete history and physical examination. Your doctor may also choose to use a dermatoscope for closer examination of each individual tumor. Other possible tests include:   Wood's lamp examination using UV light to check for changes in skin pigmentation   Genetic analysis to look for associated genetic syndromes   Skin biopsy sent to a laboratory to be  examine under a microscope   JNA - direct visualization, MRI/CT     What is the treatment for angiofibromas?    Angiofibromas are benign so they do not have to be removed, but treatment can be provided for cosmetic reasons. A couple options include:   Laser ablation using pulsed dye laser or fractional CO2  Topical rapamycin   Immunosuppressant that blocks the cell pathway causing angiofibromas   Off label use for prevention and treatment   Dermabrasion   Surgical excision for larger tumors   Treatment of the underlying genetic syndrome     JNA can be treated with a surgery called the Endoscopic Endonasal Approach, which is a minimally invasive technique using the nose and nasal cavities to reach the tumor.   Radiation therapy for tumors that cannot be surgically removed   Chemotherapy, flutamide, that blocks testosterone can reduce tumor size      Scribe Attestation    I,:  Coleen Martinez am acting as a scribe while in the presence of the attending physician.:       I,:  Timothy Christensen DO personally performed the services described in this documentation    as scribed in my presence.:

## 2025-05-01 ENCOUNTER — SOCIAL WORK (OUTPATIENT)
Dept: BEHAVIORAL/MENTAL HEALTH CLINIC | Facility: CLINIC | Age: 55
End: 2025-05-01
Payer: COMMERCIAL

## 2025-05-01 DIAGNOSIS — F41.8 DEPRESSION WITH ANXIETY: Primary | ICD-10-CM

## 2025-05-01 PROCEDURE — 90837 PSYTX W PT 60 MINUTES: CPT

## 2025-05-01 NOTE — PSYCH
"Behavioral Health Psychotherapy Progress Note    Psychotherapy Provided: Individual Psychotherapy     1. Depression with anxiety            Goals addressed in session: Goal 1     DATA: Shailesh presents today fully engaged in session, appropriate eye contact. We processed the past few weeks. We processed to the past few weeks. Today we worked on ways that Shailesh could restate his concerns to his family without adding personal attacks. We enacted a few different conversation scenarios of this. Explained to Shailesh that he may mean well, and have valid concerns, but that personal attacks make him appear to be \"harsh\" as his family states. Therapist challenged Shailesh with trying to work on this in his conversations and also reminded him that he cannot change other people, but can work on changes within himself.     During this session, this clinician used the following therapeutic modalities: Client-centered Therapy, Cognitive Behavioral Therapy, Cognitive Processing Therapy, and Motivational Interviewing    Substance Abuse was not addressed during this session. If the client is diagnosed with a co-occurring substance use disorder, please indicate any changes in the frequency or amount of use: n/a. Stage of change for addressing substance use diagnoses: No substance use/Not applicable    ASSESSMENT:  Shailesh Butts presents with a Euthymic/ normal mood.     his affect is Normal range and intensity, which is congruent, with his mood and the content of the session. The client has made progress on their goals.     Shailesh Butts presents with a minimal risk of suicide, none risk of self-harm, and none risk of harm to others.    For any risk assessment that surpasses a \"low\" rating, a safety plan must be developed.    A safety plan was indicated: no  If yes, describe in detail n/a safety plan updated today as it was due    PLAN: Between sessions, Shailesh Butts will work on self-care plan more, consider cognitive " distortions/thought trials. At the next session, the therapist will use Client-centered Therapy, Cognitive Behavioral Therapy, Cognitive Processing Therapy, and Motivational Interviewing to address depression/anxiety.    Behavioral Health Treatment Plan and Discharge Planning: Shailesh Butts is aware of and agrees to continue to work on their treatment plan. They have identified and are working toward their discharge goals. yes    Depression Follow-up Plan Completed: Not applicable    Visit start and stop times:    05/01/25  Start Time: 1600  Stop Time: 1653  Total Visit Time: 53 minutes

## 2025-05-13 ENCOUNTER — TELEPHONE (OUTPATIENT)
Dept: BEHAVIORAL/MENTAL HEALTH CLINIC | Facility: CLINIC | Age: 55
End: 2025-05-13

## 2025-05-13 DIAGNOSIS — F41.8 DEPRESSION WITH ANXIETY: Primary | ICD-10-CM

## 2025-05-13 RX ORDER — BUPROPION HYDROCHLORIDE 150 MG/1
150 TABLET ORAL DAILY
Qty: 30 TABLET | Refills: 0 | Status: SHIPPED | OUTPATIENT
Start: 2025-05-13

## 2025-05-13 NOTE — TELEPHONE ENCOUNTER
Patient contacted provider via email and provider called him back. He declined appt Wed or Thurs due to being out of town the next few days. He wants to keep 5/21 and wants to know if spouse can occasionally come to appts. Provider asked that next session just be him so we can discuss his increased depression more. Denies SI/HI or thoughts of self-harm of any kind.

## 2025-05-13 NOTE — TELEPHONE ENCOUNTER
Provider agreed to start patient on Wellbutrin since he is agreeable to trying medication. Called patient and let him know Wellbutrin was called in to pharmacy for him. Also advised him that he needs to make a PCP appt since he has not been in a while and he agreed he will do so. We are scheduled again for next week.

## 2025-05-13 NOTE — TELEPHONE ENCOUNTER
Received a message from patient's spouse with some concerns that he is sleeping more, and could not get out of bed today to go to work. He is scheduled again for 5/21 but I do have an opening tomorrow. Called patient and reached his voicemail, left message for return call if he would like to be seen tomorrow at 10am.

## 2025-05-21 ENCOUNTER — SOCIAL WORK (OUTPATIENT)
Dept: BEHAVIORAL/MENTAL HEALTH CLINIC | Facility: CLINIC | Age: 55
End: 2025-05-21
Payer: COMMERCIAL

## 2025-05-21 DIAGNOSIS — F41.1 GENERALIZED ANXIETY DISORDER: ICD-10-CM

## 2025-05-21 DIAGNOSIS — F33.2 SEVERE EPISODE OF RECURRENT MAJOR DEPRESSIVE DISORDER, WITHOUT PSYCHOTIC FEATURES (HCC): Primary | ICD-10-CM

## 2025-05-21 PROCEDURE — 90837 PSYTX W PT 60 MINUTES: CPT

## 2025-05-21 NOTE — PSYCH
"Behavioral Health Psychotherapy Progress Note    Psychotherapy Provided: Individual Psychotherapy     1. Severe episode of recurrent major depressive disorder, without psychotic features (HCC)        2. Generalized anxiety disorder            Goals addressed in session: Goal 1 and Goal 2     DATA: Shailesh presents today casually dressed. He is pleasant and fully engaged in our session, appropriate eye contact. Shaliesh states he started the Wellbutrin on Monday (2 days ago) because he was away last weekend and didn't want to start it while away on a hockey trip. He endorses that he feels slightly less \"edgy\" so far. We completed the YADIRA today and his score was 20, and his PHQ9 was also 20 indicating severe anxiety and depression. We talked about this and Shailesh states he is not suicidal, and feels \"suicide is the easy way out\". He does admit that he often feels like he wishes he would not wake up though at times. 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 He shares that starting a medication again was a big decision for him, and he also scheduled a follow up with PCP as therapist asked him to last week. He states that he and spouse did have a long talk last week about how to move forward from their resentments. We spent time processing this. Shailesh admits to often seeing things in all or nothing terms and therapist challenged him with that cognitive distortion. Self-care includes taking care of his new puppy, going for walks etc.   During this session, this clinician used the following therapeutic modalities: Client-centered Therapy, Cognitive Behavioral Therapy, Cognitive Processing Therapy, and Motivational Interviewing    Substance Abuse was not addressed during this session. If the client is diagnosed with a co-occurring substance use disorder, please indicate any changes in the frequency or amount of use: n/a. Stage of change for addressing substance " "use diagnoses: No substance use/Not applicable    ASSESSMENT:  Shailesh Butts presents with a Euthymic/ normal mood.     his affect is Normal range and intensity, which is congruent, with his mood and the content of the session. The client has made progress on their goals.     Shailesh Butts presents with a low risk of suicide, none risk of self-harm, and none risk of harm to others.    For any risk assessment that surpasses a \"low\" rating, a safety plan must be developed.    A safety plan was indicated: no  If yes, describe in detail same safety plan as previously.     PLAN: Between sessions, Shailesh Butts will keep working on self-care plan, challenging all or nothing thinking pattern. At the next session, the therapist will use Client-centered Therapy, Cognitive Behavioral Therapy, Cognitive Processing Therapy, and Motivational Interviewing to address anxiety and depression.    Behavioral Health Treatment Plan and Discharge Planning: Shailesh Butts is aware of and agrees to continue to work on their treatment plan. They have identified and are working toward their discharge goals. yes    Depression Follow-up Plan Completed: Yes - continue self-care, and challenging all or nothing cognitive distortions, same safety plan in place as previously. Denies SI/HI or thoughts of self-harm.    Visit start and stop times:    05/21/25  Start Time: 1300  Stop Time: 1400  Total Visit Time: 60 minutes  "

## 2025-05-29 ENCOUNTER — TELEPHONE (OUTPATIENT)
Age: 55
End: 2025-05-29

## 2025-05-29 ENCOUNTER — OFFICE VISIT (OUTPATIENT)
Dept: FAMILY MEDICINE CLINIC | Facility: CLINIC | Age: 55
End: 2025-05-29
Payer: COMMERCIAL

## 2025-05-29 ENCOUNTER — PREP FOR PROCEDURE (OUTPATIENT)
Age: 55
End: 2025-05-29

## 2025-05-29 VITALS
SYSTOLIC BLOOD PRESSURE: 116 MMHG | WEIGHT: 207 LBS | DIASTOLIC BLOOD PRESSURE: 78 MMHG | BODY MASS INDEX: 29.63 KG/M2 | OXYGEN SATURATION: 99 % | RESPIRATION RATE: 18 BRPM | TEMPERATURE: 98.6 F | HEIGHT: 70 IN | HEART RATE: 80 BPM

## 2025-05-29 DIAGNOSIS — F33.2 SEVERE EPISODE OF RECURRENT MAJOR DEPRESSIVE DISORDER, WITHOUT PSYCHOTIC FEATURES (HCC): ICD-10-CM

## 2025-05-29 DIAGNOSIS — F41.1 GENERALIZED ANXIETY DISORDER: Primary | ICD-10-CM

## 2025-05-29 DIAGNOSIS — Z13.6 SCREENING FOR CARDIOVASCULAR CONDITION: ICD-10-CM

## 2025-05-29 DIAGNOSIS — E03.9 ACQUIRED HYPOTHYROIDISM: ICD-10-CM

## 2025-05-29 DIAGNOSIS — Z12.5 SCREENING FOR PROSTATE CANCER: ICD-10-CM

## 2025-05-29 DIAGNOSIS — E55.9 VITAMIN D DEFICIENCY: ICD-10-CM

## 2025-05-29 DIAGNOSIS — Z12.11 SCREEN FOR COLON CANCER: ICD-10-CM

## 2025-05-29 DIAGNOSIS — Z86.0100 HISTORY OF COLON POLYPS: Primary | ICD-10-CM

## 2025-05-29 PROCEDURE — 99214 OFFICE O/P EST MOD 30 MIN: CPT

## 2025-05-29 NOTE — TELEPHONE ENCOUNTER
Scheduled date of colonoscopy (as of today): 7/3/25  Physician performing colonoscopy: Dr. Sims  Location of colonoscopy: [facility]  Bowel prep reviewed with patient: [email or web address]  Instructions reviewed with patient by: Tisha ASCENCIO, sent via email at [email or web address] per pt, has trouble getting into [facility]  Clearances: N/A

## 2025-05-29 NOTE — ASSESSMENT & PLAN NOTE
Just started wellbutrin. It can take 4-6 weeks for this medication to be fully therapeutic. Call with any affects.  Follow up in 5 wks or sooner if needed.       [Mother] : mother [Yes] : Patient goes to dentist yearly [Normal] : normal [Toothpaste] : Primary Fluoride Source: Toothpaste [Has thought about hurting self or considered suicide] : has not thought about hurting self or considered suicide [No] : Patient has not had sexual intercourse. [FreeTextEntry1] : Patient doing well at home, has no current complaints\par No issues in school, home life or social life\par Diet adequate, no eating issues\par Doing well academically\par  mental health issues, sees psychiatrist and gives meds but no therapy- MOm wants to see if she can stop prozac-Mom would like some names of others\par No ER visits or hospitalizations since last WCC\par No injuries, concussions, surgical procedures done\par \par \par \par

## 2025-05-29 NOTE — PROGRESS NOTES
Name: Shailesh Butts      : 1970      MRN: [id number]  Encounter Provider: GERMÁN Ortez  Encounter Date: 2025   Encounter department: [facility] [address]  :  Assessment & Plan  Generalized anxiety disorder  Just started wellbutrin. It can take 4-6 weeks for this medication to be fully therapeutic. Call with any affects.  Follow up in 5 wks or sooner if needed.        Acquired hypothyroidism  Lab work ordered, will call with results   Orders:  •  TSH, 3rd generation with Free T4 reflex; Future    Screening for cardiovascular condition  Lab work ordered, will call with results   Orders:  •  CBC and differential; Future  •  Comprehensive metabolic panel; Future  •  Hemoglobin A1C; Future  •  Lipid panel; Future  •  TSH, 3rd generation with Free T4 reflex; Future    Vitamin D deficiency  Lab work ordered, will call with results   Orders:  •  Vitamin D 25 hydroxy; Future    Screening for prostate cancer  Lab work ordered, will call with results   Orders:  •  PSA, Total Screen; Future    Screen for colon cancer  Referral to gi placed   Orders:  •  Ambulatory Referral to Gastroenterology; Future    Severe episode of recurrent major depressive disorder, without psychotic features (HCC)  Just started wellbutrin. It can take 4-6 weeks for this medication to be fully therapeutic. Call with any affects.  Follow up in 5 wks or sooner if needed.                 History of Present Illness   Started wellbutrin about 9 days ago, hasn't felt any side effects and feels a little calmer.       Review of Systems   Constitutional:  Negative for chills and fever.   HENT:  Negative for congestion, ear pain and sore throat.    Respiratory:  Negative for cough, chest tightness and shortness of breath.    Cardiovascular:  Negative for chest pain and palpitations.   Gastrointestinal:  Negative for abdominal pain, constipation, diarrhea, nausea and vomiting.   Genitourinary:   "Negative for dysuria, frequency and hematuria.   Musculoskeletal:  Negative for arthralgias, back pain and myalgias.   Skin:  Negative for rash.   Neurological:  Negative for dizziness, seizures, syncope, light-headedness and headaches.   Psychiatric/Behavioral:  Positive for dysphoric mood. Negative for sleep disturbance. The patient is nervous/anxious.    All other systems reviewed and are negative.      Objective   /78 (BP Location: Left arm, Patient Position: Sitting, Cuff Size: Standard)   Pulse 80   Temp 98.6 °F (37 °C) (Tympanic)   Resp 18   Ht 5' 10\" (1.778 m)   Wt 93.9 kg (207 lb)   SpO2 99%   BMI 29.70 kg/m²      Physical Exam  Vitals and nursing note reviewed.   Constitutional:       General: He is not in acute distress.     Appearance: Normal appearance. He is well-developed. He is not ill-appearing.   HENT:      Head: Normocephalic and atraumatic.      Right Ear: Tympanic membrane, ear canal and external ear normal. There is no impacted cerumen.      Left Ear: Tympanic membrane, ear canal and external ear normal. There is no impacted cerumen.      Nose: Nose normal. No congestion.      Mouth/Throat:      Mouth: Mucous membranes are moist.      Pharynx: No oropharyngeal exudate or posterior oropharyngeal erythema.     Eyes:      Extraocular Movements: Extraocular movements intact.      Conjunctiva/sclera: Conjunctivae normal.      Pupils: Pupils are equal, round, and reactive to light.       Cardiovascular:      Rate and Rhythm: Normal rate and regular rhythm.      Heart sounds: No murmur heard.  Pulmonary:      Effort: Pulmonary effort is normal. No respiratory distress.      Breath sounds: Normal breath sounds.   Abdominal:      Palpations: Abdomen is soft.      Tenderness: There is no abdominal tenderness.     Musculoskeletal:         General: No swelling.      Cervical back: Normal range of motion and neck supple.      Right lower leg: No edema.      Left lower leg: No edema. "   Lymphadenopathy:      Cervical: No cervical adenopathy.     Skin:     General: Skin is warm and dry.      Capillary Refill: Capillary refill takes less than 2 seconds.     Neurological:      General: No focal deficit present.      Mental Status: He is alert and oriented to person, place, and time.     Psychiatric:         Mood and Affect: Mood normal.         Behavior: Behavior normal.

## 2025-05-29 NOTE — TELEPHONE ENCOUNTER
05/29/25  Screened by: Tisha Ayala MA    Referring Provider GERMÁN Chamberlain    Pre- Screening:     There is no height or weight on file to calculate BMI.  Has patient been referred for a routine screening Colonoscopy? no  Is the patient between 45-75 years old? no      Previous Colonoscopy yes   If yes:    Date: 8/13/25     Facility: [facility]    Reason:         Does the patient want to see a Gastroenterologist prior to their procedure OR are they having any GI symptoms? no    Has the patient been hospitalized or had abdominal surgery in the past 6 months? no    Does the patient use supplemental oxygen? no    Does the patient take Coumadin, Lovenox, Plavix, Elliquis, Xarelto, or other blood thinning medication? no    Has the patient had a stroke, cardiac event, or stent placed in the past year? no      If patient is between 45yrs - 49yrs, please advise patient that we will have to confirm benefits & coverage with their insurance company for a routine screening colonoscopy.

## 2025-05-29 NOTE — ASSESSMENT & PLAN NOTE
Just started wellbutrin. It can take 4-6 weeks for this medication to be fully therapeutic. Call with any affects.  Follow up in 5 wks or sooner if needed.

## 2025-05-29 NOTE — ASSESSMENT & PLAN NOTE
Lab work ordered, will call with results   Orders:  •  TSH, 3rd generation with Free T4 reflex; Future

## 2025-05-30 ENCOUNTER — APPOINTMENT (OUTPATIENT)
Dept: LAB | Facility: HOSPITAL | Age: 55
End: 2025-05-30
Payer: COMMERCIAL

## 2025-05-30 DIAGNOSIS — Z12.5 SCREENING FOR PROSTATE CANCER: ICD-10-CM

## 2025-05-30 DIAGNOSIS — Z13.6 SCREENING FOR CARDIOVASCULAR CONDITION: ICD-10-CM

## 2025-05-30 DIAGNOSIS — E03.9 ACQUIRED HYPOTHYROIDISM: ICD-10-CM

## 2025-05-30 DIAGNOSIS — E55.9 VITAMIN D DEFICIENCY: ICD-10-CM

## 2025-05-30 LAB
25(OH)D3 SERPL-MCNC: 32.3 NG/ML (ref 30–100)
ALBUMIN SERPL BCG-MCNC: 4 G/DL (ref 3.5–5)
ALP SERPL-CCNC: 92 U/L (ref 34–104)
ALT SERPL W P-5'-P-CCNC: 38 U/L (ref 7–52)
ANION GAP SERPL CALCULATED.3IONS-SCNC: 3 MMOL/L (ref 4–13)
AST SERPL W P-5'-P-CCNC: 21 U/L (ref 13–39)
BASOPHILS # BLD AUTO: 0.04 THOUSANDS/ÂΜL (ref 0–0.1)
BASOPHILS NFR BLD AUTO: 1 % (ref 0–1)
BILIRUB SERPL-MCNC: 0.59 MG/DL (ref 0.2–1)
BUN SERPL-MCNC: 15 MG/DL (ref 5–25)
CALCIUM SERPL-MCNC: 9.2 MG/DL (ref 8.4–10.2)
CHLORIDE SERPL-SCNC: 104 MMOL/L (ref 96–108)
CHOLEST SERPL-MCNC: 171 MG/DL (ref ?–200)
CO2 SERPL-SCNC: 32 MMOL/L (ref 21–32)
CREAT SERPL-MCNC: 1.34 MG/DL (ref 0.6–1.3)
EOSINOPHIL # BLD AUTO: 0.15 THOUSAND/ÂΜL (ref 0–0.61)
EOSINOPHIL NFR BLD AUTO: 3 % (ref 0–6)
ERYTHROCYTE [DISTWIDTH] IN BLOOD BY AUTOMATED COUNT: 12 % (ref 11.6–15.1)
EST. AVERAGE GLUCOSE BLD GHB EST-MCNC: 108 MG/DL
GFR SERPL CREATININE-BSD FRML MDRD: 59 ML/MIN/1.73SQ M
GLUCOSE P FAST SERPL-MCNC: 107 MG/DL (ref 65–99)
HBA1C MFR BLD: 5.4 %
HCT VFR BLD AUTO: 45.9 % (ref 36.5–49.3)
HDLC SERPL-MCNC: 34 MG/DL
HGB BLD-MCNC: 14.6 G/DL (ref 12–17)
IMM GRANULOCYTES # BLD AUTO: 0.04 THOUSAND/UL (ref 0–0.2)
IMM GRANULOCYTES NFR BLD AUTO: 1 % (ref 0–2)
LDLC SERPL CALC-MCNC: 83 MG/DL (ref 0–100)
LYMPHOCYTES # BLD AUTO: 1.88 THOUSANDS/ÂΜL (ref 0.6–4.47)
LYMPHOCYTES NFR BLD AUTO: 43 % (ref 14–44)
MCH RBC QN AUTO: 27.6 PG (ref 26.8–34.3)
MCHC RBC AUTO-ENTMCNC: 31.8 G/DL (ref 31.4–37.4)
MCV RBC AUTO: 87 FL (ref 82–98)
MONOCYTES # BLD AUTO: 0.46 THOUSAND/ÂΜL (ref 0.17–1.22)
MONOCYTES NFR BLD AUTO: 10 % (ref 4–12)
NEUTROPHILS # BLD AUTO: 1.85 THOUSANDS/ÂΜL (ref 1.85–7.62)
NEUTS SEG NFR BLD AUTO: 42 % (ref 43–75)
NONHDLC SERPL-MCNC: 137 MG/DL
NRBC BLD AUTO-RTO: 0 /100 WBCS
PLATELET # BLD AUTO: 167 THOUSANDS/UL (ref 149–390)
PMV BLD AUTO: 10.6 FL (ref 8.9–12.7)
POTASSIUM SERPL-SCNC: 4.4 MMOL/L (ref 3.5–5.3)
PROT SERPL-MCNC: 7.1 G/DL (ref 6.4–8.4)
PSA SERPL-MCNC: 0.82 NG/ML (ref 0–4)
RBC # BLD AUTO: 5.29 MILLION/UL (ref 3.88–5.62)
SODIUM SERPL-SCNC: 139 MMOL/L (ref 135–147)
TRIGL SERPL-MCNC: 270 MG/DL (ref ?–150)
TSH SERPL DL<=0.05 MIU/L-ACNC: 3.22 UIU/ML (ref 0.45–4.5)
WBC # BLD AUTO: 4.42 THOUSAND/UL (ref 4.31–10.16)

## 2025-05-30 PROCEDURE — 82306 VITAMIN D 25 HYDROXY: CPT

## 2025-05-30 PROCEDURE — 83036 HEMOGLOBIN GLYCOSYLATED A1C: CPT

## 2025-05-30 PROCEDURE — G0103 PSA SCREENING: HCPCS

## 2025-05-30 PROCEDURE — 80053 COMPREHEN METABOLIC PANEL: CPT

## 2025-05-30 PROCEDURE — 85025 COMPLETE CBC W/AUTO DIFF WBC: CPT

## 2025-05-30 PROCEDURE — 80061 LIPID PANEL: CPT

## 2025-05-30 PROCEDURE — 36415 COLL VENOUS BLD VENIPUNCTURE: CPT

## 2025-05-30 PROCEDURE — 84443 ASSAY THYROID STIM HORMONE: CPT

## 2025-06-03 ENCOUNTER — RESULTS FOLLOW-UP (OUTPATIENT)
Dept: FAMILY MEDICINE CLINIC | Facility: CLINIC | Age: 55
End: 2025-06-03

## 2025-06-04 ENCOUNTER — SOCIAL WORK (OUTPATIENT)
Dept: BEHAVIORAL/MENTAL HEALTH CLINIC | Facility: CLINIC | Age: 55
End: 2025-06-04
Payer: COMMERCIAL

## 2025-06-04 DIAGNOSIS — E78.1 HYPERTRIGLYCERIDEMIA: Primary | ICD-10-CM

## 2025-06-04 DIAGNOSIS — F33.2 SEVERE EPISODE OF RECURRENT MAJOR DEPRESSIVE DISORDER, WITHOUT PSYCHOTIC FEATURES (HCC): Primary | ICD-10-CM

## 2025-06-04 DIAGNOSIS — F41.1 GENERALIZED ANXIETY DISORDER: ICD-10-CM

## 2025-06-04 PROCEDURE — 90837 PSYTX W PT 60 MINUTES: CPT

## 2025-06-04 RX ORDER — FENOFIBRATE 54 MG/1
54 TABLET ORAL DAILY
Qty: 30 TABLET | Refills: 5 | Status: SHIPPED | OUTPATIENT
Start: 2025-06-04

## 2025-06-04 NOTE — PSYCH
"Behavioral Health Psychotherapy Progress Note    Psychotherapy Provided: Individual Psychotherapy     1. Severe episode of recurrent major depressive disorder, without psychotic features (HCC)        2. Generalized anxiety disorder            Goals addressed in session: Goal 1     DATA: Shailesh presents today casually dressed, fully engaged in session, appropriate eye contact.Shailesh shares he is not feeling any side effects from Wellbutrin, and not feeling benefits as yet (it has been only about 2 weeks). He denies sleeping a lot, and says he is up and doing all his normal very busy activities with work. From a self-care perspective he did see his PCP, have labs done, and scheduled his colonoscopy. Shailesh shared his continued frustrations with his marriage. Therapist challenged him today about the need for him to focus on his own actions rather than his spouse's actions. Shailesh acknowledges this, and we discussed things that are within his control to change. Shailesh needs frequent redirection to focus back on his actions, rather than others.   During this session, this clinician used the following therapeutic modalities: Client-centered Therapy, Cognitive Behavioral Therapy, Cognitive Processing Therapy, and Motivational Interviewing    Substance Abuse was not addressed during this session. If the client is diagnosed with a co-occurring substance use disorder, please indicate any changes in the frequency or amount of use: n/a. Stage of change for addressing substance use diagnoses: No substance use/Not applicable    ASSESSMENT:  Shailesh Butts presents with a Euthymic/ normal mood.     his affect is Normal range and intensity, which is congruent, with his mood and the content of the session. The client has made progress on their goals.     Shailesh Butts presents with a minimal risk of suicide, none risk of self-harm, and none risk of harm to others.    For any risk assessment that surpasses a \"low\" rating, a safety plan " must be developed.    A safety plan was indicated: no  If yes, describe in detail n/a    PLAN: Between sessions, Shailesh Butts will focus on things within his control, continue self-care plans. At the next session, the therapist will use Client-centered Therapy, Cognitive Behavioral Therapy, Cognitive Processing Therapy, and Motivational Interviewing to address depression/anxiety.    Behavioral Health Treatment Plan and Discharge Planning: Shailesh Butts is aware of and agrees to continue to work on their treatment plan. They have identified and are working toward their discharge goals. yes    Depression Follow-up Plan Completed: Not applicable    Visit start and stop times:    06/04/25  Start Time: 1000  Stop Time: 1055  Total Visit Time: 55 minutes

## 2025-06-09 DIAGNOSIS — F41.8 DEPRESSION WITH ANXIETY: ICD-10-CM

## 2025-06-10 RX ORDER — BUPROPION HYDROCHLORIDE 150 MG/1
150 TABLET ORAL DAILY
Qty: 90 TABLET | Refills: 1 | Status: SHIPPED | OUTPATIENT
Start: 2025-06-10

## 2025-06-24 DIAGNOSIS — R09.82 POST-NASAL DRAINAGE: ICD-10-CM

## 2025-06-24 DIAGNOSIS — K21.00 GASTROESOPHAGEAL REFLUX DISEASE WITH ESOPHAGITIS WITHOUT HEMORRHAGE: ICD-10-CM

## 2025-06-24 DIAGNOSIS — E03.9 ACQUIRED HYPOTHYROIDISM: ICD-10-CM

## 2025-06-24 RX ORDER — PANTOPRAZOLE SODIUM 40 MG/1
40 TABLET, DELAYED RELEASE ORAL DAILY
Qty: 30 TABLET | Refills: 5 | Status: SHIPPED | OUTPATIENT
Start: 2025-06-24

## 2025-06-24 RX ORDER — LEVOTHYROXINE SODIUM 50 UG/1
50 TABLET ORAL DAILY
Qty: 30 TABLET | Refills: 5 | Status: SHIPPED | OUTPATIENT
Start: 2025-06-24

## 2025-06-24 RX ORDER — FLUTICASONE PROPIONATE 50 MCG
SPRAY, SUSPENSION (ML) NASAL
Qty: 16 ML | Refills: 5 | Status: SHIPPED | OUTPATIENT
Start: 2025-06-24

## 2025-06-26 DIAGNOSIS — E78.1 HYPERTRIGLYCERIDEMIA: ICD-10-CM

## 2025-06-27 RX ORDER — FENOFIBRATE 54 MG/1
54 TABLET ORAL DAILY
Qty: 90 TABLET | Refills: 1 | Status: SHIPPED | OUTPATIENT
Start: 2025-06-27

## 2025-07-02 ENCOUNTER — SOCIAL WORK (OUTPATIENT)
Dept: BEHAVIORAL/MENTAL HEALTH CLINIC | Facility: CLINIC | Age: 55
End: 2025-07-02
Payer: COMMERCIAL

## 2025-07-02 DIAGNOSIS — F41.1 GENERALIZED ANXIETY DISORDER: ICD-10-CM

## 2025-07-02 DIAGNOSIS — F33.2 SEVERE EPISODE OF RECURRENT MAJOR DEPRESSIVE DISORDER, WITHOUT PSYCHOTIC FEATURES (HCC): Primary | ICD-10-CM

## 2025-07-02 PROCEDURE — 90837 PSYTX W PT 60 MINUTES: CPT

## 2025-07-02 NOTE — PSYCH
Behavioral Health Psychotherapy Progress Note    Psychotherapy Provided: Individual Psychotherapy     1. Severe episode of recurrent major depressive disorder, without psychotic features (HCC)        2. Generalized anxiety disorder            Goals addressed in session: Goal 1     DATA: Shailesh presents casually dressed today and informs therapist that he asked his wife Danielle to come to session today and she is on her way. Danielle arrived about 10 minutes into session. Shailesh wanted to bring up numerous old issues and tends to not bring a complaint to Danielle but rather personal criticisms. Shailesh shared that his mom noticed a change in him with him not getting as upset/angry as quickly. Danielle disagrees and feels that he is more angry than before. As they discussed this, they both agree that he may have about 28 days a month that are great and then 2-3 where he blows up. Explained to Shailesh that some of the ways he expresses his complaints and anger are often verbal abuse. He is reluctant to agree that he is not justified to be this way. Discussed with both of them that this therapist will not do marriage counseling for them as Shailesh is this therapist individual patient. Explained to them both that I recommend they seek frequent marriage counseling with a separate therapist, gave them contact information for a local practice that does this for them to look into. Shailesh sees his PCP next week and also has that as a f/u for starting the wellbutrin. Encouraged him that he should discuss with PCP if he should try an increase in the medication. Shailesh states he does not have SI/HI or thoughts of self-harm of any kind. He admits that he gets angry easily. Discussed with him that we need to work on him reframing the things he is saying and for him to recognize other emotions beyond just happy and angry.   During this session, this clinician used the following therapeutic modalities: Client-centered Therapy, Cognitive Behavioral Therapy,  "Motivational Interviewing, and Solution-Focused Therapy    Substance Abuse was not addressed during this session. If the client is diagnosed with a co-occurring substance use disorder, please indicate any changes in the frequency or amount of use: n/a. Stage of change for addressing substance use diagnoses: No substance use/Not applicable    ASSESSMENT:  Shailesh Butts presents with a Euthymic/ normal and Angry mood.     his affect is Normal range and intensity, which is congruent, with his mood and the content of the session. The client has made progress on their goals.     Shailesh Butts presents with a minimal risk of suicide, none risk of self-harm, and none risk of harm to others.    For any risk assessment that surpasses a \"low\" rating, a safety plan must be developed.    A safety plan was indicated: no  If yes, describe in detail n/a    PLAN: Between sessions, Shailesh Butts will schedule marriage counseling with another provider, complete f/u with PCP next week. At the next session, the therapist will use Client-centered Therapy, Cognitive Behavioral Therapy, Cognitive Processing Therapy, and Motivational Interviewing to address anxiety/ depression/ self-regulation.    Behavioral Health Treatment Plan and Discharge Planning: Shailesh Butts is aware of and agrees to continue to work on their treatment plan. They have identified and are working toward their discharge goals. yes    Depression Follow-up Plan Completed: Not applicable    Visit start and stop times:    07/02/25  Start Time: 1000  Stop Time: 1102  Total Visit Time: 62 minutes  "

## 2025-07-03 ENCOUNTER — ANESTHESIA (OUTPATIENT)
Dept: GASTROENTEROLOGY | Facility: HOSPITAL | Age: 55
End: 2025-07-03
Payer: COMMERCIAL

## 2025-07-03 ENCOUNTER — HOSPITAL ENCOUNTER (OUTPATIENT)
Dept: GASTROENTEROLOGY | Facility: HOSPITAL | Age: 55
Setting detail: OUTPATIENT SURGERY
Discharge: HOME | End: 2025-07-03
Attending: INTERNAL MEDICINE
Payer: COMMERCIAL

## 2025-07-03 ENCOUNTER — ANESTHESIA EVENT (OUTPATIENT)
Dept: GASTROENTEROLOGY | Facility: HOSPITAL | Age: 55
End: 2025-07-03
Payer: COMMERCIAL

## 2025-07-03 VITALS
WEIGHT: 201.06 LBS | BODY MASS INDEX: 28.78 KG/M2 | RESPIRATION RATE: 22 BRPM | DIASTOLIC BLOOD PRESSURE: 65 MMHG | TEMPERATURE: 97 F | HEIGHT: 70 IN | OXYGEN SATURATION: 95 % | HEART RATE: 55 BPM | SYSTOLIC BLOOD PRESSURE: 98 MMHG

## 2025-07-03 DIAGNOSIS — Z86.0100 HISTORY OF COLON POLYPS: ICD-10-CM

## 2025-07-03 PROCEDURE — G0105 COLORECTAL SCRN; HI RISK IND: HCPCS | Performed by: INTERNAL MEDICINE

## 2025-07-03 RX ORDER — SODIUM CHLORIDE, SODIUM LACTATE, POTASSIUM CHLORIDE, CALCIUM CHLORIDE 600; 310; 30; 20 MG/100ML; MG/100ML; MG/100ML; MG/100ML
INJECTION, SOLUTION INTRAVENOUS CONTINUOUS PRN
Status: DISCONTINUED | OUTPATIENT
Start: 2025-07-03 | End: 2025-07-03

## 2025-07-03 RX ORDER — LIDOCAINE HYDROCHLORIDE 20 MG/ML
INJECTION, SOLUTION EPIDURAL; INFILTRATION; INTRACAUDAL; PERINEURAL AS NEEDED
Status: DISCONTINUED | OUTPATIENT
Start: 2025-07-03 | End: 2025-07-03

## 2025-07-03 RX ORDER — PROPOFOL 10 MG/ML
INJECTION, EMULSION INTRAVENOUS AS NEEDED
Status: DISCONTINUED | OUTPATIENT
Start: 2025-07-03 | End: 2025-07-03

## 2025-07-03 RX ADMIN — SODIUM CHLORIDE, SODIUM LACTATE, POTASSIUM CHLORIDE, AND CALCIUM CHLORIDE: .6; .31; .03; .02 INJECTION, SOLUTION INTRAVENOUS at 13:15

## 2025-07-03 RX ADMIN — PROPOFOL 20 MG: 10 INJECTION, EMULSION INTRAVENOUS at 13:30

## 2025-07-03 RX ADMIN — PROPOFOL 50 MG: 10 INJECTION, EMULSION INTRAVENOUS at 13:22

## 2025-07-03 RX ADMIN — LIDOCAINE HYDROCHLORIDE 80 MG: 20 INJECTION, SOLUTION EPIDURAL; INFILTRATION; INTRACAUDAL; PERINEURAL at 13:22

## 2025-07-03 RX ADMIN — PROPOFOL 50 MG: 10 INJECTION, EMULSION INTRAVENOUS at 13:24

## 2025-07-03 RX ADMIN — PROPOFOL 50 MG: 10 INJECTION, EMULSION INTRAVENOUS at 13:23

## 2025-07-03 RX ADMIN — PROPOFOL 40 MG: 10 INJECTION, EMULSION INTRAVENOUS at 13:29

## 2025-07-03 RX ADMIN — PROPOFOL 50 MG: 10 INJECTION, EMULSION INTRAVENOUS at 13:25

## 2025-07-03 RX ADMIN — PROPOFOL 40 MG: 10 INJECTION, EMULSION INTRAVENOUS at 13:27

## 2025-07-03 NOTE — ANESTHESIA PREPROCEDURE EVALUATION
Procedure:  COLONOSCOPY    Relevant Problems   ENDO   (+) Acquired hypothyroidism      NEURO/PSYCH   (+) Depression with anxiety   (+) Generalized anxiety disorder   (+) Severe episode of recurrent major depressive disorder, without psychotic features (HCC)      PULMONARY   (+) HILLARY (obstructive sleep apnea)   (+) HILLARY on CPAP   (+) Shortness of breath        Physical Exam    Airway     Mallampati score: III  TM Distance: >3 FB        Cardiovascular  Cardiovascular exam normal    Dental   No notable dental hx     Pulmonary  Pulmonary exam normal     Neurological      Other Findings        Anesthesia Plan  ASA Score- 2     Anesthesia Type- IV sedation with anesthesia with ASA Monitors.         Additional Monitors:     Airway Plan:            Plan Factors-    Chart reviewed.    Patient summary reviewed.                  Induction- intravenous.    Postoperative Plan- .   Monitoring Plan - Monitoring plan - standard ASA monitoring          Informed Consent- Anesthetic plan and risks discussed with patient.  I personally reviewed this patient with the CRNA. Discussed and agreed on the Anesthesia Plan with the CRNA..      NPO Status:  Vitals Value Taken Time   Date of last liquid 07/02/25 07/03/25 12:12   Time of last liquid 2359 07/03/25 12:12   Date of last solid 07/01/25 07/03/25 12:12   Time of last solid 1000 07/03/25 12:12

## 2025-07-03 NOTE — H&P
"History and Physical -  [facility]  Shailesh Butts 55 y.o. male MRN: [id number]                  HPI: Shailesh Butts is a 55 y.o. year old male who presents for colonoscopy for history of colon polyps      REVIEW OF SYSTEMS: Per the HPI, and otherwise unremarkable.    Historical Information   Past Medical History[1]  Past Surgical History[2]  Social History   Social History     Substance and Sexual Activity   Alcohol Use Yes    Alcohol/week: 4.0 standard drinks of alcohol    Types: 2 Cans of beer, 2 Shots of liquor per week    Comment: weekly     Social History     Substance and Sexual Activity   Drug Use No     Tobacco Use History[3]  Family History[4]    Meds/Allergies     Not in a hospital admission.    Allergies[5]    Objective     Blood pressure 123/70, pulse 78, temperature (!) 97 °F (36.1 °C), temperature source Temporal, resp. rate 20, height 5' 10\" (1.778 m), weight 91.2 kg (201 lb 1 oz), SpO2 97%.      PHYSICAL EXAM    /70   Pulse 78   Temp (!) 97 °F (36.1 °C) (Temporal)   Resp 20   Ht 5' 10\" (1.778 m)   Wt 91.2 kg (201 lb 1 oz)   SpO2 97%   BMI 28.85 kg/m²       Gen: NAD  CV: RRR  CHEST: Clear  ABD: soft, NT/ND  EXT: no edema      ASSESSMENT/PLAN:  This is a 55 y.o. year old male here for colonoscopy, and he is stable and optimized for his procedure.             [1]   Past Medical History:  Diagnosis Date    Anxiety     Depression     HILLARY (obstructive sleep apnea)     Snoring    [2]   Past Surgical History:  Procedure Laterality Date    BACK SURGERY      COLONOSCOPY      HAND NERVE REPAIR Left     LUMBAR DISCECTOMY      [place] ESOPHAGOGASTRODUODENOSCOPY TRANSORAL DIAGNOSTIC N/A 6/26/2017    Procedure: EGD AND COLONOSCOPY;  Surgeon: Meir Sims III, MD;  Location: [facility];  Service: Gastroenterology   [3]   Social History  Tobacco Use   Smoking Status Former   Smokeless Tobacco Current    Types: Chew   Tobacco Comments    social smoker   [4]   Family History  Problem " Relation Name Age of Onset    Bipolar disorder Mother      ADD / ADHD Mother          ADHD    Substance Abuse Mother      Bipolar disorder Brother      Substance Abuse Brother      Bipolar disorder Son     [5] No Known Allergies

## 2025-07-10 ENCOUNTER — OFFICE VISIT (OUTPATIENT)
Dept: FAMILY MEDICINE CLINIC | Facility: CLINIC | Age: 55
End: 2025-07-10
Payer: COMMERCIAL

## 2025-07-10 VITALS
OXYGEN SATURATION: 95 % | DIASTOLIC BLOOD PRESSURE: 80 MMHG | SYSTOLIC BLOOD PRESSURE: 126 MMHG | HEART RATE: 70 BPM | WEIGHT: 205 LBS | HEIGHT: 70 IN | BODY MASS INDEX: 29.35 KG/M2

## 2025-07-10 DIAGNOSIS — F41.1 GENERALIZED ANXIETY DISORDER: ICD-10-CM

## 2025-07-10 DIAGNOSIS — Z00.00 ANNUAL PHYSICAL EXAM: Primary | ICD-10-CM

## 2025-07-10 PROCEDURE — 99213 OFFICE O/P EST LOW 20 MIN: CPT

## 2025-07-10 PROCEDURE — 99386 PREV VISIT NEW AGE 40-64: CPT

## 2025-07-10 RX ORDER — BUPROPION HYDROCHLORIDE 150 MG/1
150 TABLET, EXTENDED RELEASE ORAL 2 TIMES DAILY
Qty: 60 TABLET | Refills: 3 | Status: SHIPPED | OUTPATIENT
Start: 2025-07-10

## 2025-07-10 NOTE — PATIENT INSTRUCTIONS
"Patient Education     Routine physical for adults   The Basics   Written by the doctors and editors at [facility]   What is a physical? -- A physical is a routine visit, or \"check-up,\" with your doctor. You might also hear it called a \"wellness visit\" or \"preventive visit.\"  During each visit, the doctor will:   Ask about your physical and mental health   Ask about your habits, behaviors, and lifestyle   Do an exam   Give you vaccines if needed   Talk to you about any medicines you take   Give advice about your health   Answer your questions  Getting regular check-ups is an important part of taking care of your health. It can help your doctor find and treat any problems you have. But it's also important for preventing health problems.  A routine physical is different from a \"sick visit.\" A sick visit is when you see a doctor because of a health concern or problem. Since physicals are scheduled ahead of time, you can think about what you want to ask the doctor.  How often should I get a physical? -- It depends on your age and health. In general, for people age 21 years and older:   If you are younger than 50 years, you might be able to get a physical every 3 years.   If you are 50 years or older, your doctor might recommend a physical every year.  If you have an ongoing health condition, like diabetes or high blood pressure, your doctor will probably want to see you more often.  What happens during a physical? -- In general, each visit will include:   Physical exam - The doctor or nurse will check your height, weight, heart rate, and blood pressure. They will also look at your eyes and ears. They will ask about how you are feeling and whether you have any symptoms that bother you.   Medicines - It's a good idea to bring a list of all the medicines you take to each doctor visit. Your doctor will talk to you about your medicines and answer any questions. Tell them if you are having any side effects that bother you. You " "should also tell them if you are having trouble paying for any of your medicines.   Habits and behaviors - This includes:   Your diet   Your exercise habits   Whether you smoke, drink alcohol, or use drugs   Whether you are sexually active   Whether you feel safe at home  Your doctor will talk to you about things you can do to improve your health and lower your risk of health problems. They will also offer help and support. For example, if you want to quit smoking, they can give you advice and might prescribe medicines. If you want to improve your diet or get more physical activity, they can help you with this, too.   Lab tests, if needed - The tests you get will depend on your age and situation. For example, your doctor might want to check your:   Cholesterol   Blood sugar   Iron level   Vaccines - The recommended vaccines will depend on your age, health, and what vaccines you already had. Vaccines are very important because they can prevent certain serious or deadly infections.   Discussion of screening - \"Screening\" means checking for diseases or other health problems before they cause symptoms. Your doctor can recommend screening based on your age, risk, and preferences. This might include tests to check for:   Cancer, such as breast, prostate, cervical, ovarian, colorectal, prostate, lung, or skin cancer   Sexually transmitted infections, such as chlamydia and gonorrhea   Mental health conditions like depression and anxiety  Your doctor will talk to you about the different types of screening tests. They can help you decide which screenings to have. They can also explain what the results might mean.   Answering questions - The physical is a good time to ask the doctor or nurse questions about your health. If needed, they can refer you to other doctors or specialists, too.  Adults older than 65 years often need other care, too. As you get older, your doctor will talk to you about:   How to prevent falling at " home   Hearing or vision tests   Memory testing   How to take your medicines safely   Making sure that you have the help and support you need at home  All topics are updated as new evidence becomes available and our peer review process is complete.  This topic retrieved from [employer] on: May 02, 2024.  Topic 206383 Version 1.0  Release: 32.4.3 - C32.122  © 2024 UpToDate, Inc. and/or its affiliates. All rights reserved.  Consumer Information Use and Disclaimer   Disclaimer: This generalized information is a limited summary of diagnosis, treatment, and/or medication information. It is not meant to be comprehensive and should be used as a tool to help the user understand and/or assess potential diagnostic and treatment options. It does NOT include all information about conditions, treatments, medications, side effects, or risks that may apply to a specific patient. It is not intended to be medical advice or a substitute for the medical advice, diagnosis, or treatment of a health care provider based on the health care provider's examination and assessment of a patient's specific and unique circumstances. Patients must speak with a health care provider for complete information about their health, medical questions, and treatment options, including any risks or benefits regarding use of medications. This information does not endorse any treatments or medications as safe, effective, or approved for treating a specific patient. UpToDate, Inc. and its affiliates disclaim any warranty or liability relating to this information or the use thereof.The use of this information is governed by the Terms of Use, available at https://www.woltersThinktwiceuwer.com/en/know/clinical-effectiveness-terms. 2024© UpToDate, Inc. and its affiliates and/or licensors. All rights reserved.  Copyright   © 2024 UpToDate, Inc. and/or its affiliates. All rights reserved.

## 2025-07-10 NOTE — PROGRESS NOTES
Adult Annual Physical  Name: Shailesh Butts      : 1970      MRN: [id number]  Encounter Provider: GERMÁN Ortez  Encounter Date: 7/10/2025   Encounter department: [facility] [address] [facility]    :  Assessment & Plan  Annual physical exam  Recommend Mediterranean diet and 2.5 hours of exercise weekly have the ordered fasting lab work to recommend quitting chewing tobacco.       Generalized anxiety disorder  Will switch from long-acting Wellbutrin to short acting twice daily follow-up in 1 month call with any questions concerns or side effects. It can take 4-6 weeks for this medication to be fully therapeutic. Call with any affects.    Orders:  •  buPROPion (Wellbutrin SR) 150 mg 12 hr tablet; Take 1 tablet (150 mg total) by mouth 2 (two) times a day    Annual physical exam               Preventive Screenings:  - Diabetes Screening: screening up-to-date  - Cholesterol Screening: screening not indicated and has hyperlipidemia   - Hepatitis C screening: screening up-to-date   - HIV screening: screening up-to-date   - Colon cancer screening: screening up-to-date   - Lung cancer screening: screening not indicated   - Prostate cancer screening: screening up-to-date     Immunizations:  - Immunizations due: Prevnar 20, Tdap and Zoster (Shingrix)    Counseling/Anticipatory Guidance:  - Alcohol: discussed moderation in alcohol intake and recommendations for healthy alcohol use.   - Drug use: discussed harms of illicit drug use and how it can negatively impact mental/physical health.   - Tobacco use: discussed harms of tobacco use and management options for quitting.   - Dental health: discussed importance of regular tooth brushing, flossing, and dental visits.   - Sexual health: discussed sexually transmitted diseases, partner selection, use of condoms, avoidance of unintended pregnancy, and contraceptive alternatives.   - Diet: discussed recommendations for a  healthy/well-balanced diet.   - Exercise: the importance of regular exercise/physical activity was discussed. Recommend exercise 3-5 times per week for at least 30 minutes.   - Injury prevention: discussed safety/seat belts, safety helmets, smoke detectors, carbon monoxide detectors, and smoking near bedding or upholstery.          History of Present Illness     Adult Annual Physical:  Patient presents for annual physical.     Diet and Physical Activity:  - Diet/Nutrition: well balanced diet.  - Exercise: vigorous cardiovascular exercise and 5-7 times a week on average.    General Health:  - Sleep: 4-6 hours of sleep on average, 7-8 hours of sleep on average and uses CPAP machine.  - Hearing: normal hearing right ear and normal hearing left ear.  - Vision: most recent eye exam > 1 year ago.  - Dental: regular dental visits, brushes teeth three times daily and floss regularly.     Health:  - History of STDs: no.   - Urinary symptoms: none.     Advanced Care Planning:  - Has an advanced directive?: no    - Has a durable medical POA?: no    - ACP document given to patient?: no      Review of Systems   Constitutional:  Negative for chills, diaphoresis and fever.   HENT:  Negative for congestion, ear pain, sinus pressure, sinus pain and sore throat.    Eyes:  Negative for visual disturbance.   Respiratory:  Positive for cough. Negative for chest tightness, shortness of breath and wheezing.    Cardiovascular:  Negative for chest pain and palpitations.   Gastrointestinal:  Negative for abdominal pain, constipation, diarrhea, nausea and vomiting.   Genitourinary:  Negative for dysuria, frequency and hematuria.   Musculoskeletal:  Negative for arthralgias, back pain and myalgias.   Skin:  Negative for rash.   Neurological:  Negative for dizziness, seizures, syncope, light-headedness and headaches.   Psychiatric/Behavioral:  Negative for dysphoric mood and sleep disturbance. The patient is not nervous/anxious.    All other  "systems reviewed and are negative.    Pertinent Medical History           Medical History Reviewed by provider this encounter:  Tobacco  Allergies  Meds  Problems  Med Hx  Surg Hx  Fam Hx     .  Past Medical History   Past Medical History[1]  Past Surgical History[2]  Family History[3]   reports that he has quit smoking. His smokeless tobacco use includes chew. He reports current alcohol use of about 4.0 standard drinks of alcohol per week. He reports that he does not use drugs.  Current Outpatient Medications   Medication Instructions   • buPROPion (WELLBUTRIN SR) 150 mg, Oral, 2 times daily   • fenofibrate (TRICOR) 54 mg, Oral, Daily   • fluticasone (FLONASE) 50 mcg/act nasal spray SPRAY 1 SPRAY INTO EACH NOSTRIL EVERY DAY   • levothyroxine 50 mcg, Oral, Daily   • pantoprazole (PROTONIX) 40 mg, Oral, Daily   Allergies[4]   Medications Ordered Prior to Encounter[5]   Social History[6]    Objective   /80   Pulse 70   Ht 5' 10\" (1.778 m)   Wt 93 kg (205 lb)   SpO2 95%   BMI 29.41 kg/m²     Physical Exam  Vitals and nursing note reviewed.   Constitutional:       General: He is not in acute distress.     Appearance: Normal appearance. He is well-developed. He is not ill-appearing.   HENT:      Head: Normocephalic and atraumatic.      Right Ear: Tympanic membrane, ear canal and external ear normal. There is no impacted cerumen.      Left Ear: Tympanic membrane, ear canal and external ear normal. There is no impacted cerumen.      Nose: Nose normal. No congestion.      Mouth/Throat:      Mouth: Mucous membranes are moist.      Pharynx: No oropharyngeal exudate or posterior oropharyngeal erythema.     Eyes:      Extraocular Movements: Extraocular movements intact.      Conjunctiva/sclera: Conjunctivae normal.      Pupils: Pupils are equal, round, and reactive to light.       Cardiovascular:      Rate and Rhythm: Normal rate and regular rhythm.      Heart sounds: No murmur heard.  Pulmonary:      Effort: " Pulmonary effort is normal. No respiratory distress.      Breath sounds: Normal breath sounds.   Abdominal:      Palpations: Abdomen is soft.      Tenderness: There is no abdominal tenderness.     Musculoskeletal:         General: No swelling.      Cervical back: Normal range of motion and neck supple.      Right lower leg: No edema.      Left lower leg: No edema.   Lymphadenopathy:      Cervical: No cervical adenopathy.     Skin:     General: Skin is warm and dry.      Capillary Refill: Capillary refill takes less than 2 seconds.     Neurological:      General: No focal deficit present.      Mental Status: He is alert and oriented to person, place, and time.     Psychiatric:         Mood and Affect: Mood normal.         Behavior: Behavior normal.                [1]  Past Medical History:  Diagnosis Date   • Anxiety    • Depression    • HILLARY (obstructive sleep apnea)    • Snoring    [2]  Past Surgical History:  Procedure Laterality Date   • BACK SURGERY     • COLONOSCOPY     • HAND NERVE REPAIR Left    • LUMBAR DISCECTOMY     • [place] ESOPHAGOGASTRODUODENOSCOPY TRANSORAL DIAGNOSTIC N/A 6/26/2017    Procedure: EGD AND COLONOSCOPY;  Surgeon: Meir Sims III, MD;  Location: [facility];  Service: Gastroenterology   [3]  Family History  Problem Relation Name Age of Onset   • Bipolar disorder Mother     • ADD / ADHD Mother          ADHD   • Substance Abuse Mother     • Bipolar disorder Brother     • Substance Abuse Brother     • Bipolar disorder Son     [4]  No Known Allergies[5]  Current Outpatient Medications on File Prior to Visit   Medication Sig Dispense Refill   • fenofibrate (TRICOR) 54 MG tablet TAKE 1 TABLET BY MOUTH DAILY 90 tablet 1   • fluticasone (FLONASE) 50 mcg/act nasal spray SPRAY 1 SPRAY INTO EACH NOSTRIL EVERY DAY 16 mL 5   • levothyroxine 50 mcg tablet TAKE 1 TABLET BY MOUTH EVERY DAY 30 tablet 5   • pantoprazole (PROTONIX) 40 mg tablet TAKE 1 TABLET (40 MG TOTAL) BY MOUTH DAILY DO NOT START BEFORE  JANUARY 1, 2024. 30 tablet 5   • [DISCONTINUED] buPROPion (WELLBUTRIN XL) 150 mg 24 hr tablet TAKE 1 TABLET BY MOUTH EVERY DAY 90 tablet 1     No current facility-administered medications on file prior to visit.   [6]  Social History  Tobacco Use   • Smoking status: Former   • Smokeless tobacco: Current     Types: Chew   • Tobacco comments:     social smoker   Vaping Use   • Vaping status: Never Used   Substance and Sexual Activity   • Alcohol use: Yes     Alcohol/week: 4.0 standard drinks of alcohol     Types: 2 Cans of beer, 2 Shots of liquor per week     Comment: weekly   • Drug use: No

## 2025-07-10 NOTE — ASSESSMENT & PLAN NOTE
Will switch from long-acting Wellbutrin to short acting twice daily follow-up in 1 month call with any questions concerns or side effects. It can take 4-6 weeks for this medication to be fully therapeutic. Call with any affects.    Orders:  •  buPROPion (Wellbutrin SR) 150 mg 12 hr tablet; Take 1 tablet (150 mg total) by mouth 2 (two) times a day

## 2025-07-18 DIAGNOSIS — K21.00 GASTROESOPHAGEAL REFLUX DISEASE WITH ESOPHAGITIS WITHOUT HEMORRHAGE: ICD-10-CM

## 2025-07-18 RX ORDER — PANTOPRAZOLE SODIUM 40 MG/1
40 TABLET, DELAYED RELEASE ORAL DAILY
Qty: 90 TABLET | Refills: 1 | Status: SHIPPED | OUTPATIENT
Start: 2025-07-18

## 2025-07-22 ENCOUNTER — SOCIAL WORK (OUTPATIENT)
Dept: BEHAVIORAL/MENTAL HEALTH CLINIC | Facility: CLINIC | Age: 55
End: 2025-07-22
Payer: COMMERCIAL

## 2025-07-22 DIAGNOSIS — F41.1 GENERALIZED ANXIETY DISORDER: ICD-10-CM

## 2025-07-22 DIAGNOSIS — F33.2 SEVERE EPISODE OF RECURRENT MAJOR DEPRESSIVE DISORDER, WITHOUT PSYCHOTIC FEATURES (HCC): Primary | ICD-10-CM

## 2025-07-22 PROCEDURE — 90837 PSYTX W PT 60 MINUTES: CPT

## 2025-07-22 NOTE — PSYCH
Behavioral Health Psychotherapy Progress Note    Psychotherapy Provided: Individual Psychotherapy     1. Severe episode of recurrent major depressive disorder, without psychotic features (HCC)        2. Generalized anxiety disorder            Goals addressed in session: Goal 1     DATA: Shailesh presents today casually dressed, fully engaged in session, appropriate eye contact. We processed his recent events. Shailesh states that he and spouse continue to argue daily and just had an argument this morning before he came to appointment. He describes what he says in response to her and therapist pointed out to Shailesh that the way he says things can easily be consider verbal abuse and threatening. He states he would never physically hurt his wife and that this is how he has always talked. Worked with him on reframing the things he is saying to put them in context. He did follow through with seeing his PCP for yearly physical and f/u of wellbutrin and that dosage was changed to bid instead of extended release. He says he does notice he is calmer overall, but that the arguing with his spouse continues to be when he feels very angry. He denies SI/HI or thoughts of self-harm.   Discussed again with him the idea of taking more breaks from work. 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  During this session, this clinician used the following therapeutic modalities: Client-centered Therapy, Cognitive Behavioral Therapy, Cognitive Processing Therapy, and Motivational Interviewing    Substance Abuse was not addressed during this session. If the client is diagnosed with a co-occurring substance use disorder, please indicate any changes in the frequency or amount of use: n/a. Stage of change for addressing substance use diagnoses: No substance use/Not applicable    ASSESSMENT:  Shailesh Butts presents with a Euthymic/ normal mood.     his affect  "is Normal range and intensity, which is congruent, with his mood and the content of the session. The client has made progress on their goals.     Shailesh Butts presents with a low risk of suicide, none risk of self-harm, and minimal risk of harm to others.    For any risk assessment that surpasses a \"low\" rating, a safety plan must be developed.    A safety plan was indicated: no  If yes, describe in detail same safety plan as previously/ no changes.    PLAN: Between sessions, Shailesh Butts will work on reframing his comments, particularly to spouse. At the next session, the therapist will use Client-centered Therapy, Cognitive Behavioral Therapy, Cognitive Processing Therapy, and Motivational Interviewing to address depression.    Behavioral Health Treatment Plan and Discharge Planning: Shailesh Butts is aware of and agrees to continue to work on their treatment plan. They have identified and are working toward their discharge goals. yes    Depression Follow-up Plan Completed: Not applicable    Visit start and stop times:    07/22/25  Start Time: 1000  Stop Time: 1056  Total Visit Time: 56 minutes  "

## 2025-08-01 DIAGNOSIS — F41.1 GENERALIZED ANXIETY DISORDER: ICD-10-CM

## 2025-08-04 ENCOUNTER — APPOINTMENT (OUTPATIENT)
Age: 55
End: 2025-08-04
Payer: COMMERCIAL

## 2025-08-04 DIAGNOSIS — E78.1 HYPERTRIGLYCERIDEMIA: ICD-10-CM

## 2025-08-04 LAB
ALBUMIN SERPL BCG-MCNC: 4 G/DL (ref 3.5–5)
ALP SERPL-CCNC: 83 U/L (ref 34–104)
ALT SERPL W P-5'-P-CCNC: 29 U/L (ref 7–52)
ANION GAP SERPL CALCULATED.3IONS-SCNC: 7 MMOL/L (ref 4–13)
AST SERPL W P-5'-P-CCNC: 19 U/L (ref 13–39)
BASOPHILS # BLD AUTO: 0.04 THOUSANDS/ÂΜL (ref 0–0.1)
BASOPHILS NFR BLD AUTO: 1 % (ref 0–1)
BILIRUB SERPL-MCNC: 0.8 MG/DL (ref 0.2–1)
BUN SERPL-MCNC: 15 MG/DL (ref 5–25)
CALCIUM SERPL-MCNC: 9.2 MG/DL (ref 8.4–10.2)
CHLORIDE SERPL-SCNC: 104 MMOL/L (ref 96–108)
CHOLEST SERPL-MCNC: 139 MG/DL (ref ?–200)
CO2 SERPL-SCNC: 28 MMOL/L (ref 21–32)
CREAT SERPL-MCNC: 1.23 MG/DL (ref 0.6–1.3)
EOSINOPHIL # BLD AUTO: 0.14 THOUSAND/ÂΜL (ref 0–0.61)
EOSINOPHIL NFR BLD AUTO: 3 % (ref 0–6)
ERYTHROCYTE [DISTWIDTH] IN BLOOD BY AUTOMATED COUNT: 12.1 % (ref 11.6–15.1)
GFR SERPL CREATININE-BSD FRML MDRD: 66 ML/MIN/1.73SQ M
GLUCOSE P FAST SERPL-MCNC: 97 MG/DL (ref 65–99)
HCT VFR BLD AUTO: 44.7 % (ref 36.5–49.3)
HDLC SERPL-MCNC: 36 MG/DL
HGB BLD-MCNC: 14.7 G/DL (ref 12–17)
IMM GRANULOCYTES # BLD AUTO: 0.04 THOUSAND/UL (ref 0–0.2)
IMM GRANULOCYTES NFR BLD AUTO: 1 % (ref 0–2)
LDLC SERPL CALC-MCNC: 75 MG/DL (ref 0–100)
LYMPHOCYTES # BLD AUTO: 1.36 THOUSANDS/ÂΜL (ref 0.6–4.47)
LYMPHOCYTES NFR BLD AUTO: 24 % (ref 14–44)
MCH RBC QN AUTO: 28.4 PG (ref 26.8–34.3)
MCHC RBC AUTO-ENTMCNC: 32.9 G/DL (ref 31.4–37.4)
MCV RBC AUTO: 87 FL (ref 82–98)
MONOCYTES # BLD AUTO: 0.66 THOUSAND/ÂΜL (ref 0.17–1.22)
MONOCYTES NFR BLD AUTO: 12 % (ref 4–12)
NEUTROPHILS # BLD AUTO: 3.34 THOUSANDS/ÂΜL (ref 1.85–7.62)
NEUTS SEG NFR BLD AUTO: 59 % (ref 43–75)
NONHDLC SERPL-MCNC: 103 MG/DL
NRBC BLD AUTO-RTO: 0 /100 WBCS
PLATELET # BLD AUTO: 181 THOUSANDS/UL (ref 149–390)
PMV BLD AUTO: 11.2 FL (ref 8.9–12.7)
POTASSIUM SERPL-SCNC: 4.3 MMOL/L (ref 3.5–5.3)
PROT SERPL-MCNC: 7.3 G/DL (ref 6.4–8.4)
RBC # BLD AUTO: 5.17 MILLION/UL (ref 3.88–5.62)
SODIUM SERPL-SCNC: 139 MMOL/L (ref 135–147)
TRIGL SERPL-MCNC: 139 MG/DL (ref ?–150)
WBC # BLD AUTO: 5.58 THOUSAND/UL (ref 4.31–10.16)

## 2025-08-04 PROCEDURE — 85025 COMPLETE CBC W/AUTO DIFF WBC: CPT

## 2025-08-04 PROCEDURE — 80061 LIPID PANEL: CPT

## 2025-08-04 PROCEDURE — 80053 COMPREHEN METABOLIC PANEL: CPT

## 2025-08-04 PROCEDURE — 36415 COLL VENOUS BLD VENIPUNCTURE: CPT

## 2025-08-04 RX ORDER — BUPROPION HYDROCHLORIDE 150 MG/1
150 TABLET, EXTENDED RELEASE ORAL 2 TIMES DAILY
Qty: 180 TABLET | Refills: 1 | Status: SHIPPED | OUTPATIENT
Start: 2025-08-04

## 2025-08-06 ENCOUNTER — OFFICE VISIT (OUTPATIENT)
Dept: FAMILY MEDICINE CLINIC | Facility: CLINIC | Age: 55
End: 2025-08-06

## 2025-08-06 VITALS
HEIGHT: 70 IN | WEIGHT: 202 LBS | BODY MASS INDEX: 28.92 KG/M2 | DIASTOLIC BLOOD PRESSURE: 74 MMHG | RESPIRATION RATE: 18 BRPM | SYSTOLIC BLOOD PRESSURE: 120 MMHG | HEART RATE: 72 BPM | OXYGEN SATURATION: 98 %

## 2025-08-06 DIAGNOSIS — E78.2 MIXED HYPERLIPIDEMIA: ICD-10-CM

## 2025-08-06 DIAGNOSIS — H61.22 HEARING LOSS OF LEFT EAR DUE TO CERUMEN IMPACTION: ICD-10-CM

## 2025-08-06 DIAGNOSIS — E03.9 ACQUIRED HYPOTHYROIDISM: ICD-10-CM

## 2025-08-06 DIAGNOSIS — F33.2 SEVERE EPISODE OF RECURRENT MAJOR DEPRESSIVE DISORDER, WITHOUT PSYCHOTIC FEATURES (HCC): Primary | ICD-10-CM
